# Patient Record
Sex: MALE | Race: WHITE
[De-identification: names, ages, dates, MRNs, and addresses within clinical notes are randomized per-mention and may not be internally consistent; named-entity substitution may affect disease eponyms.]

---

## 2017-04-11 ENCOUNTER — HOSPITAL ENCOUNTER (OUTPATIENT)
Dept: HOSPITAL 47 - RADUSWWP | Age: 52
End: 2017-04-11
Payer: COMMERCIAL

## 2017-04-11 DIAGNOSIS — K76.0: Primary | ICD-10-CM

## 2017-04-11 PROCEDURE — 76705 ECHO EXAM OF ABDOMEN: CPT

## 2017-04-11 NOTE — US
EXAMINATION TYPE: US abdomen limited

 

DATE OF EXAM: 4/11/2017 9:51 AM

 

COMPARISON: NONE

 

CLINICAL HISTORY: R10.11 RUQ Abd Pain, R10.31 RLQ Abd Pain. Umbilical pain that moves to the right si
de x 4 days

 

EXAM MEASUREMENTS:

 

Liver Length:  17.1 cm   

Gallbladder Wall:  0.2 cm   

CBD:  0.5 cm

Right Kidney:  10.1 x 4.3 x 5.4 cm

 

**large body habitus with overlying bowel gas limits exam

 

Pancreas:  unable to fully assess due to bowel gas obscuring

Liver:  intercostal imaging, difficult to penetrate  

Gallbladder:  wnl

**Evidence for sonographic Guzman's sign:  no

CBD:  wnl 

Right Kidney:  wnl 

 

Pancreas is suboptimally evaluated due to patient's large body habitus and overlying bowel gas. Visua
lized liver is heterogeneously hyperechoic in appearance without intrahepatic ductal dilatation. Eval
uation for focal masses is suboptimal due to the heterogeneity. Finding is likely on basis of diffuse
 fatty infiltration. No shadowing mobile gallstones are seen in gallbladder.

 

IMPRESSION: No gallstones or ultrasound evidence for acute cholecystitis. Diffuse fatty infiltration 
of liver is noted.

## 2017-04-11 NOTE — US
EXAMINATION TYPE: US abdomen APPY

 

DATE OF EXAM: 4/11/2017 9:48 AM

 

COMPARISON: NONE

 

CLINICAL HISTORY: R10.11 RUQ Abd Pain, R10.31 RLQ Abd Pain. Umbilical pain that moves to the right si
de x 4 days

 

APPENDIX

AP Diameter (normal < 6mm):  5 mm

     Measured outer wall to outer wall.

 

Is the appendix seen in its entirety from the proximal cecum to distal end:  no 

Is the appendix compressible:  yes 

Does the appendix wall appear hypervascular:  no 

Is an appendicolith present:  no 

Is there inflammatory changes or free fluid present:  no 

 

Tubular shaped structure in right lower quadrant is identified measuring 4 to 5 mm in thickness. It i
s not seen in its entirety. Compression is present. No surrounding vascularity is seen. No well-forme
d fluid collection or abscess is noted.

 

 

 

IMPRESSION:  No convincing ultrasound evidence for acute appendicitis in visualized portion of append
ix. Entire appendix is not successfully visualized however.

## 2017-04-19 ENCOUNTER — HOSPITAL ENCOUNTER (OUTPATIENT)
Dept: HOSPITAL 47 - RADNMMAIN | Age: 52
Discharge: HOME | End: 2017-04-19
Payer: COMMERCIAL

## 2017-04-19 DIAGNOSIS — R10.9: Primary | ICD-10-CM

## 2017-04-19 PROCEDURE — 78226 HEPATOBILIARY SYSTEM IMAGING: CPT

## 2017-04-19 NOTE — NM
EXAMINATION TYPE: NM hepatobiliary w EF

 

DATE OF EXAM: 4/19/2017 11:09 AM

 

COMPARISON: NONE

 

HISTORY: R10.9 abd pain

 

 

TECHNIQUE: After the intravenous administration of 5 mCi Tc 99m Mebrofenin hepatobiliary scintigraphy
 is performed.  1.5 mcg of CCK was also administered. Immediate images post injection.

 

FINDINGS: 

There is satisfactory initial accumulation of tracer by the liver.  The gallbladder is visualized wit
hin 8 minutes.  The small bowel activity is noted within 32 minutes. Gallbladder ejection fraction is
 estimated at 61%. 

 

IMPRESSION: Exam is within normal limits.

## 2020-09-05 ENCOUNTER — HOSPITAL ENCOUNTER (EMERGENCY)
Dept: HOSPITAL 47 - EC | Age: 55
Discharge: HOME | End: 2020-09-05
Payer: COMMERCIAL

## 2020-09-05 VITALS — HEART RATE: 84 BPM | DIASTOLIC BLOOD PRESSURE: 101 MMHG | SYSTOLIC BLOOD PRESSURE: 134 MMHG

## 2020-09-05 VITALS — TEMPERATURE: 98.2 F | RESPIRATION RATE: 18 BRPM

## 2020-09-05 DIAGNOSIS — S61.211A: Primary | ICD-10-CM
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
## 2020-09-05 DIAGNOSIS — Z96.60: ICD-10-CM
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
## 2020-09-05 DIAGNOSIS — Z23: ICD-10-CM
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
## 2020-09-05 PROCEDURE — 90715 TDAP VACCINE 7 YRS/> IM: CPT

## 2020-09-05 PROCEDURE — 12001 RPR S/N/AX/GEN/TRNK 2.5CM/<: CPT

## 2020-09-05 PROCEDURE — 90471 IMMUNIZATION ADMIN: CPT

## 2020-09-05 PROCEDURE — 99283 EMERGENCY DEPT VISIT LOW MDM: CPT

## 2020-09-05 NOTE — ED
General Adult HPI





- General


Source: patient, RN notes reviewed, old records reviewed


Mode of arrival: ambulatory


Limitations: no limitations





<Valentín Bland - Last Filed: 09/06/20 10:19>





<Dorothy Barry - Last Filed: 09/06/20 13:12>





- General


Chief complaint: Wound/Laceration


Stated complaint: IHS-finger lac


Time Seen by Provider: 09/05/20 17:34





- History of Present Illness


Initial comments: 








55-year-old male patient presents to ED for evaluation of laceration.  Patient 

reports she was using a straight razor when he cut the second digit of his left 

hand dorsal aspect proximal to the PIP joint.  Patient reports full active range

of motion sensation is digit.  Denies any other complaints.  Does not know date 

of last tetanus.





Systemic: Pt denies fatigue, fever/chills, rash. Pt denies weakness, night 

sweats, weight loss. 


Neuro: Pt denies headache, visual disturbances, syncope or pre-syncope.


HEENT: Pt denies ocular discharge or irritation, otalgia, rhinorrhea, 

pharyngitis or notable lymphadenopathy. 


Cardiopulmonary: Pt denies chest pain, SOB, heart palpitations, dyspnea on 

exertion.  


Abdominal/GI: Pt denies abdominal pain, n/v/d. 


: Pt denies dysuria, burning w/ urination, frequency/urgency. Denies new onset

urinary or bowel incontinence.  


MSK: Pt denies myalgia, loss of strength or function in extremities. 


Neuro: Pt denies new onset weakness, paresthesias. 








 (Valentín Bland)





- Related Data


                                    Allergies











Allergy/AdvReac Type Severity Reaction Status Date / Time


 


No Known Allergies Allergy   Verified 09/05/20 17:29














Review of Systems


ROS Other: All systems not noted in ROS Statement are negative.





<Valentín Bland - Last Filed: 09/06/20 10:19>


ROS Other: All systems not noted in ROS Statement are negative.





<Dorothy Barry - Last Filed: 09/06/20 13:12>


ROS Statement: 


Those systems with pertinent positive or pertinent negative responses have been 

documented in the HPI.








Past Medical History


Past Medical History: Hypertension


History of Any Multi-Drug Resistant Organisms: None Reported


Past Surgical History: Joint Replacement, Tonsillectomy


Additional Past Surgical History / Comment(s): B knee, R forearm, deviated 

septum surgery


Past Psychological History: No Psychological Hx Reported


Smoking Status: Never smoker


Past Alcohol Use History: Occasional


Past Drug Use History: None Reported





<Valentín Bland - Last Filed: 09/06/20 10:19>





General Exam


Limitations: no limitations





<Valentín Bland - Last Filed: 09/06/20 10:19>





- General Exam Comments


Initial Comments: 








Constitutional: NAD, AOX3, Pt has pleasant affect. 


HEENT: NC/AT, trachea midline, neck supple, no lymphadenopathy. External ears 

appear normal, without discharge. Mucous membranes moist. EOM intact. There is 

no scleral icterus. No pallor noted. 


Cardiopulmonary: RRR, no murmurs, rubs or gallops, no JVD noted. Lungs CTAB in 

anterior and posterior fields. No peripheral edema. 


Abdominal exam: Abdomen soft and non-distended. Abdomen non-tender to palpation 

in all 4 quadrants. Bowel sounds active in LLQ. No hepatosplenomegaly. No 

ecchymosis


Neuro: CN II-XII grossly intact. No nuchal rigidity. 


MSK: 2 cm laceration to second digit left hand dorsal aspect proximal to the PIP

joint.  Irrigated approximately 2 simple interrupted sutures.  Full active range

of motion of digit or vascular intact before and after suture placement.  Full 

active ROM in upper and lower extremities, 5/5 stregnth. 








 (Valentín Bland)





Course





                                   Vital Signs











  09/05/20 09/05/20





  17:29 18:48


 


Temperature 98.2 F 


 


Pulse Rate 94 84


 


Respiratory 18 





Rate  


 


Blood Pressure 149/114 134/101


 


O2 Sat by Pulse 97 96





Oximetry  














Procedures





- Laceration


  ** Laceration #1


Consent Obtained: verbal consent


Indication: laceration


Site: hand (2nd digit L hand )


Size (cm): 2


Description: linear


Anesthetic Used: lidocaine 1%


Anesthesia Technique: local infiltration


Amount (mls): 2


Pre-repair: wound explored, irrigated extensively, deep structures intact


Type of Sutures: nylon


Size of Sutures: 5-0


Number of Sutures: 2


Technique: simple, interrupted


Patient Tolerated Procedure: well, no complications





<Valentín Bland - Last Filed: 09/06/20 10:19>





Medical Decision Making





<Valentín Bland - Last Filed: 09/06/20 10:19>





<Dorothy Barry - Last Filed: 09/06/20 13:12>





- Medical Decision Making








55-year-old male patient presents ED for evaluation of laceration.  Patient 

vital signs stable, afebrile.  Vital exams were laceration which was repaired.  

Patient was discharged return precautions.  Tetanus updated.  Case discussed 

with Dr. Barry. 


 (Valentín Bland)


I was available for consultation in the emergency department.  The history and 

physical exam were done by the midlevel provider. I was consulted for this jessi

ents care. I reviewed the case with the midlevel provider and based on their 

presentation of the patient, I agree with the assessment, medical decision 

making and plan of care as documented. 


Chart was dictated using Dragon dictation software.  Attempts were made to 

correct any dictation errors however some typographical errors may persist. 


Patient was seen during a national state of emergency due to the Covid-19 

pandemic. 


 (Dorothy Barry)





Disposition


Is patient prescribed a controlled substance at d/c from ED?: No





<Valentín Bland - Last Filed: 09/06/20 10:19>





<Dorothy Barry - Last Filed: 09/06/20 13:12>


Clinical Impression: 


 Laceration





Disposition: HOME SELF-CARE


Condition: Stable


Instructions (If sedation given, give patient instructions):  Care For Your 

Stitches (DC), Laceration (DC)


Additional Instructions: 


follow-up with primary care provider in 1-2 days.  Keep area clean and dry and 

covered.  Monitor for infection.  Return to ER with any worsening symptoms.





Please return for suture removal: 





Hand: 7-10 days





Please monitor for signs and symptoms of infection including: redness, warmth, 

drainage, discharge. 





Please return to ED if these signs or symptoms occur, new signs or symptoms 

develop or if condition worsens in anyway. 


Referrals: 


Gurpreet Dickinson DO [Primary Care Provider] - 1-2 days

## 2020-09-21 ENCOUNTER — HOSPITAL ENCOUNTER (INPATIENT)
Dept: HOSPITAL 47 - EC | Age: 55
LOS: 2 days | Discharge: HOME | DRG: 309 | End: 2020-09-23
Attending: INTERNAL MEDICINE | Admitting: INTERNAL MEDICINE
Payer: COMMERCIAL

## 2020-09-21 DIAGNOSIS — I48.0: Primary | ICD-10-CM

## 2020-09-21 DIAGNOSIS — G47.33: ICD-10-CM

## 2020-09-21 DIAGNOSIS — I08.1: ICD-10-CM

## 2020-09-21 DIAGNOSIS — Z96.653: ICD-10-CM

## 2020-09-21 DIAGNOSIS — Z90.89: ICD-10-CM

## 2020-09-21 DIAGNOSIS — N17.9: ICD-10-CM

## 2020-09-21 DIAGNOSIS — E78.5: ICD-10-CM

## 2020-09-21 DIAGNOSIS — K21.9: ICD-10-CM

## 2020-09-21 DIAGNOSIS — Z79.899: ICD-10-CM

## 2020-09-21 DIAGNOSIS — I11.9: ICD-10-CM

## 2020-09-21 DIAGNOSIS — R07.89: ICD-10-CM

## 2020-09-21 DIAGNOSIS — E66.9: ICD-10-CM

## 2020-09-21 LAB
ALBUMIN SERPL-MCNC: 4 G/DL (ref 3.5–5)
ALP SERPL-CCNC: 79 U/L (ref 38–126)
ALT SERPL-CCNC: 54 U/L (ref 4–49)
ANION GAP SERPL CALC-SCNC: 5 MMOL/L
APTT BLD: 23.3 SEC (ref 22–30)
AST SERPL-CCNC: 34 U/L (ref 17–59)
BASOPHILS # BLD AUTO: 0.1 K/UL (ref 0–0.2)
BASOPHILS NFR BLD AUTO: 0 %
BUN SERPL-SCNC: 28 MG/DL (ref 9–20)
CALCIUM SPEC-MCNC: 9.1 MG/DL (ref 8.4–10.2)
CHLORIDE SERPL-SCNC: 108 MMOL/L (ref 98–107)
CO2 SERPL-SCNC: 24 MMOL/L (ref 22–30)
EOSINOPHIL # BLD AUTO: 0.4 K/UL (ref 0–0.7)
EOSINOPHIL NFR BLD AUTO: 3 %
ERYTHROCYTE [DISTWIDTH] IN BLOOD BY AUTOMATED COUNT: 5.42 M/UL (ref 4.3–5.9)
ERYTHROCYTE [DISTWIDTH] IN BLOOD: 13.6 % (ref 11.5–15.5)
GLUCOSE SERPL-MCNC: 77 MG/DL (ref 74–99)
HCT VFR BLD AUTO: 49.5 % (ref 39–53)
HGB BLD-MCNC: 16.5 GM/DL (ref 13–17.5)
INR PPP: 0.9 (ref ?–1.2)
LYMPHOCYTES # SPEC AUTO: 2.3 K/UL (ref 1–4.8)
LYMPHOCYTES NFR SPEC AUTO: 21 %
MAGNESIUM SPEC-SCNC: 2 MG/DL (ref 1.6–2.3)
MCH RBC QN AUTO: 30.4 PG (ref 25–35)
MCHC RBC AUTO-ENTMCNC: 33.3 G/DL (ref 31–37)
MCV RBC AUTO: 91.3 FL (ref 80–100)
MONOCYTES # BLD AUTO: 0.5 K/UL (ref 0–1)
MONOCYTES NFR BLD AUTO: 5 %
NEUTROPHILS # BLD AUTO: 7.7 K/UL (ref 1.3–7.7)
NEUTROPHILS NFR BLD AUTO: 69 %
PLATELET # BLD AUTO: 319 K/UL (ref 150–450)
POTASSIUM SERPL-SCNC: 4.6 MMOL/L (ref 3.5–5.1)
PROT SERPL-MCNC: 7.1 G/DL (ref 6.3–8.2)
PT BLD: 9.7 SEC (ref 9–12)
SODIUM SERPL-SCNC: 137 MMOL/L (ref 137–145)
WBC # BLD AUTO: 11 K/UL (ref 3.8–10.6)

## 2020-09-21 PROCEDURE — 71046 X-RAY EXAM CHEST 2 VIEWS: CPT

## 2020-09-21 PROCEDURE — 83735 ASSAY OF MAGNESIUM: CPT

## 2020-09-21 PROCEDURE — 36415 COLL VENOUS BLD VENIPUNCTURE: CPT

## 2020-09-21 PROCEDURE — 99291 CRITICAL CARE FIRST HOUR: CPT

## 2020-09-21 PROCEDURE — 85025 COMPLETE CBC W/AUTO DIFF WBC: CPT

## 2020-09-21 PROCEDURE — 84484 ASSAY OF TROPONIN QUANT: CPT

## 2020-09-21 PROCEDURE — 96366 THER/PROPH/DIAG IV INF ADDON: CPT

## 2020-09-21 PROCEDURE — 80061 LIPID PANEL: CPT

## 2020-09-21 PROCEDURE — 93306 TTE W/DOPPLER COMPLETE: CPT

## 2020-09-21 PROCEDURE — 80048 BASIC METABOLIC PNL TOTAL CA: CPT

## 2020-09-21 PROCEDURE — 85610 PROTHROMBIN TIME: CPT

## 2020-09-21 PROCEDURE — 80053 COMPREHEN METABOLIC PANEL: CPT

## 2020-09-21 PROCEDURE — 96376 TX/PRO/DX INJ SAME DRUG ADON: CPT

## 2020-09-21 PROCEDURE — 85730 THROMBOPLASTIN TIME PARTIAL: CPT

## 2020-09-21 PROCEDURE — 96365 THER/PROPH/DIAG IV INF INIT: CPT

## 2020-09-21 PROCEDURE — 93005 ELECTROCARDIOGRAM TRACING: CPT

## 2020-09-21 RX ADMIN — PANTOPRAZOLE SODIUM SCH MG: 40 TABLET, DELAYED RELEASE ORAL at 23:13

## 2020-09-21 RX ADMIN — CEPHALEXIN SCH MG: 500 CAPSULE ORAL at 23:13

## 2020-09-21 NOTE — ED
Chest Pain HPI





<Juventino Vázquez - Last Filed: 09/21/20 17:48>





- General


Source: patient


Mode of arrival: ambulatory


Limitations: no limitations





<Vera Montano - Last Filed: 09/21/20 18:16>





- General


Chief Complaint: Chest Pain


Stated Complaint: esophagus problems/pressure


Time Seen by Provider: 09/21/20 16:44





- History of Present Illness


Initial Comments: 





Patient is a 55-year-old male, history hypertension, presenting to the emergency

Department with complaints of chest tightness that has been intermittent over 

the past few days.  He is also complaining about intermittent dizziness and 

shortness of breath when he feels like his heart rate increases.  Patient states

he does not have a history of A. fib but feels like he has been going in and out

of it for years now.  Patient states he was mildly evaluated for it a few years 

ago but he was never in A. fib.  He is currently not on blood thinners.  Patient

states what made him come in to the ER today was he again had an episode of 

chest tightness and palpitations as well as nausea that started yesterday and 

continued into today.  Patient states she attempted to go to work today but felt

really dizzy and so he decided to come in to the ER.  He describes his tightness

at the middle of his chest.  He denies any recent fever, chills, cough.  He does

not feel like he short of breath at this time.  He denies any blurry vision, 

abdominal pain, nausea or vomiting.  He has been taking his hypertensive 

medication as prescribed.  He states he occasionally takes a baby aspirin and he

also takes Adipex for years now.  He has no further complaints at this time.  

Upon arrival to the ER, his vital signs are stable, pulse is 75.  However upon 

recheck during exam, Gurpreet is 160. (Vera Montano)





- Related Data


                                    Allergies











Allergy/AdvReac Type Severity Reaction Status Date / Time


 


No Known Allergies Allergy   Verified 09/05/20 17:29














Review of Systems


ROS Other: All systems not noted in ROS Statement are negative.





<GurpreetJuventino - Last Filed: 09/21/20 17:48>


ROS Other: All systems not noted in ROS Statement are negative.





<Vera Montano - Last Filed: 09/21/20 18:16>


ROS Statement: 


Those systems with pertinent positive or pertinent negative responses have been 

documented in the HPI.








EKG Findings





- EKG Comments:


EKG Findings:: EKG shows atrial fibrillation with RVR, no signs of acute 

ischemia.  Ventricular rate is 160, QRS duration 90, .





<Vera Montano - Last Filed: 09/21/20 18:16>





Past Medical History


Past Medical History: GERD/Reflux, Hypertension


History of Any Multi-Drug Resistant Organisms: None Reported


Past Surgical History: Joint Replacement, Orthopedic Surgery, Tonsillectomy


Additional Past Surgical History / Comment(s): B knee, R forearm, deviated 

septum surgery


Past Psychological History: No Psychological Hx Reported


Smoking Status: Never smoker


Past Alcohol Use History: Occasional


Past Drug Use History: None Reported





<Vera Montano - Last Filed: 09/21/20 18:16>





General Exam


Limitations: no limitations





<Vera Montano - Last Filed: 09/21/20 18:16>





- General Exam Comments


Initial Comments: 





GENERAL: 


Patient is well-developed and well-nourished.  Patient is mildly diaphoretic, in

no acute distress.





HEAD: 


Atraumatic, normocephalic.





EYES:


Pupils equal round and reactive to light, extraocular movements intact, sclera 

anicteric, conjunctiva are normal.  Eyelids were unremarkable.





ENT: 


TMs normal, nares patent, oropharynx clear without exudates.  Moist mucous 

membranes.





NECK: 


Normal range of motion, supple without lymphadenopathy or JVD.





LUNGS:


Unlabored respirations.  Breath sounds clear to auscultation bilaterally and 

equal.  No wheezes rales or rhonchi.





HEART:


Tachycardic, irregular rate and rhythm without murmurs, rubs or gallops.





ABDOMEN: 


Soft, nontender, normoactive bowel sounds.  No guarding, no rebound.  No masses 

appreciated.





: Deferred 





MUSCULOSKELETAL: 


Normal extremities with adequate strength and normal range of motion, no pitting

or edema.  No clubbing or cyanosis.





NEUROLOGICAL: 


Patient is alert and oriented x 3.  Motor and sensory are also intact.  Cranial 

nerves II through XII grossly intact.  Symmetrical smile.  Normal speech, normal

gait.   





PSYCH:


Normal mood, normal affect.





SKIN:


 Warm, Dry, normal turgor, no rashes or lesions noted. (Vera Montano)





Course





<Juventino Vázquez - Last Filed: 09/21/20 17:48>


                                   Vital Signs











  09/21/20 09/21/20 09/21/20





  16:19 17:31 17:52


 


Temperature 98.3 F  


 


Pulse Rate 75 122 H 128 H


 


Respiratory 18 18 18





Rate   


 


Blood Pressure 116/81 109/75 91/58


 


O2 Sat by Pulse 97 95 96





Oximetry   














- Reevaluation(s)


Reevaluation #1: 





09/21/20 17:48


PA supervision: I proceeded face-to-face evaluation the patient.  He states he 

started developing palpitations exertional dyspnea and some midsternal chest 

pain yesterday morning.  It persisted throughout today.  He was found upon 

arrival to have A. fib with RVR.  I did examine patient did demonstrate an 

irregularly irregular heartbeat with tachycardia and lungs were clear no overt 

peripheral edema.  I did discuss case with Dr. Pierce patient will be admitted 

with cardiology consultation.  Patient will be anticoagulated and placed on a 

Cardizem drip. (Juventino Vázquez)





Chest Pain MDM





<Vera Montano - Last Filed: 09/21/20 18:16>





- [facility]





Patient is a 55-year-old male here for chest pain, intermittent dizziness and 

nausea since yesterday.  Initial EKG showed A. fib with RVR, heart rate from 160

to 180s.  Patient is slightly diaphoretic.  He denies history of A. fib, he has 

not on blood thinners.  Rest of his vitals are normal.  Labs show slight 

leukocytosis, most likely reactive, rest of labs are within normal limits.  

Troponin is normal.  Chest x-ray shows no acute process.  Patient was started on

a Cardizem drip, low dose heparin, patient will be admitted with cardiac 

consult.  Patient was accepted by Dr. Pierce.  Case discussed with Dr. Vázquez.  

(Vera Montano)





Critical Care Time


Critical Care Time: Yes


Total Critical Care Time: 35 (New-onset A. fib with RVR, heart rate from 160s to

180s, started on Cardizem drip, chest pain, dizziness.)





<Vera Montano - Last Filed: 09/21/20 18:16>





Disposition





<Juventino Vázquez - Last Filed: 09/21/20 17:48>


Decision Date: 09/21/20


Decision Time: 17:36





<Vera Montano - Last Filed: 09/21/20 18:16>


Clinical Impression: 


 Chest pain, New onset a-fib, Atrial fibrillation with rapid ventricular 

response





Disposition: ADMITTED AS IP TO THIS HOSP


Condition: Stable


Referrals: 


Gurpreet Dikcinson DO [Primary Care Provider] - 1-2 days

## 2020-09-21 NOTE — XR
EXAMINATION TYPE: XR chest 2V

 

DATE OF EXAM: 9/21/2020

 

COMPARISON: 12/3/2013

 

HISTORY: Chest pain

 

TECHNIQUE:

 

FINDINGS: There is minimal linear density left lung base. There is no definite pleural effusion. Ther
e are no hilar masses. Heart size is normal. Bony thorax is intact. There are chest leads.

 

IMPRESSION: Mild pleural reaction left lung base unchanged. Normal heart.

## 2020-09-21 NOTE — P.HPIM
History of Present Illness


H&P Date: 09/21/20


Chief Complaint: A. fib with RVR, chest pain and angina, esophageal pressure and

pain.





55-year-old mildly overweight male one of Dr. Dickinson patient with past 

medical history of hypertension, hyperlipidemia, obstructive sleep apnea does 

not use his CPAP, mild obesity, hyperglycemia who has been complaining for 

arrhythmia on and off for a few years in the last 2 weeks become much worse 

according to him yesterday patient developed to have quieted arrhythmia with 

sweating and felt quite bit hydrated despite drinking enough water symptom did 

not improve with refused to come to demurs department at the time ended up 

resting in the symptom this appeared.  Patient felt better this morning as is 

getting ready to go to work developed to have midsternal pain and discomfort 

radiating toward the jaw area on the left upper side of his chest wall his 

symptoms become much worse associated with palpitation and lightheadedness 

nausea and cold sweat.  Patient ended up coming to the emergency department at 

[facility] with the Na problem was seen and evaluated CK with 

troponin came back negative patient found to be in A. fib with RVR pulse rate 

running over 160 beats per minutes.  Patient was started on Cardizem drip along 

with heparin drip will be admitted to the hospital his CK with troponin 2 was 

negative so far patient be seeing cardiology and continue aggressive medical 

management with his presentation specially feeling presyncope when he had a 

chest pain patient might need to go for heart cath.





Review of Systems





CONSTITUTIONAL: Well-developed no acute respiratory distress.


EYES: No icterus sclerae, no conjunctivitis.


EARS, NOSE, MOUTH, THROAT, and FACE: No sore throat, lymphadenopathy, carotid 

bruits or deformity.


RESPIRATORY: Positive chest pain shortness of breath positive tightness and 

palpitation.


CARDIOVASCULAR: Positive chest pain with angina with significant palpitation and

A. fib would lightheadedness.


GASTROINTESTINAL: No Abd pain, Nausea or vomiting, no Diarrhea or constipation, 

No GI Bleed, no distention or masses.


GENITOURINARY: Negative for Hematuria or UTI, no kidney stones.


INTEGUMENT/BREAST: Negative for any muscular injury with mild osteoarthritis..


HEMATOLOGIC/LYMPHATIC: Negative for bleed or purpura.


MUSCULOSKELTAL: Negative for Myalgia or arthralgia.


NEURLOGICAL: No LOC, Sz or syncope, blurred vision dizziness or abnormality..


BEHAVIORAL/PSYCH: Negative.


ENDOCRINE: Negative.





Past Medical History


Past Medical History: GERD/Reflux, Hypertension


History of Any Multi-Drug Resistant Organisms: None Reported


Past Surgical History: Joint Replacement, Orthopedic Surgery, Tonsillectomy


Additional Past Surgical History / Comment(s): B knee, R forearm, deviated 

septum surgery


Past Psychological History: No Psychological Hx Reported


Smoking Status: Never smoker


Past Alcohol Use History: Occasional


Past Drug Use History: None Reported





Medications and Allergies


                                Home Medications











 Medication  Instructions  Recorded  Confirmed  Type


 


Cephalexin [Keflex] 500 mg PO QID 09/21/20 09/21/20 History


 


Ibuprofen 800 mg PO Q8H PRN 09/21/20 09/21/20 History


 


Lansoprazole 30 mg PO BID 09/21/20 09/21/20 History


 


Losartan Potassium [Cozaar] 100 mg PO DAILY 09/21/20 09/21/20 History


 


Phentermine HCl 37.5 mg PO AC-BRKFST 09/21/20 09/21/20 History


 


Sildenafil Citrate 100 mg PO DAILY PRN 09/21/20 09/21/20 History


 


traMADol HCl [Ultram] 50 mg PO TID PRN 09/21/20 09/21/20 History








                                    Allergies











Allergy/AdvReac Type Severity Reaction Status Date / Time


 


No Known Allergies Allergy   Verified 09/21/20 18:36














Physical Exam


Vitals: 


                                   Vital Signs











  Temp Pulse Resp BP Pulse Ox


 


 09/21/20 18:10   115 H  16  120/91 


 


 09/21/20 17:52   128 H  18  91/58  96


 


 09/21/20 17:31   122 H  18  109/75  95


 


 09/21/20 16:19  98.3 F  75  18  116/81  97








                                Intake and Output











 09/21/20 09/21/20 09/21/20





 06:59 14:59 22:59


 


Other:   


 


  Weight   143.789 kg








General Appearance: Alert, cooperative, no distress, appears stated age.  

Morbidly obese.


Neck HEENT: Supple, no lymphadenopathy, no thyroid enlargement, no carotid 

bruits.


Lungs: Clear to auscultation without crackles or wheezes no rhonchi, no 

deformity.


Chest Wall: Decrease expansion with deep inspiration no tenderness and no 

deformity was found on exam, no costochondral pain or discomfort.


Heart: Irregular rate and rhythm, S1, S2 positive sleep systolic murmur.


Back: Symmetric, no curvature, ROM normal, no CVA tenderness.


Abdomen: Soft, non-tender, bowel sounds active all four quadrants, no masses, no

organomegaly.


Extremities: Extremities normal, atraumatic, no cyanosis or edema.


Pulses: 2+ and symmetric.


Skin: Skin color, texture, tugor normal, no rashes or lesions.


Neurologic: Alert oriented x3 cranial nerves II through XII intact, no motor 

deficit, no abnormal balance or gait.





Results


CBC & Chem 7: 


                                 09/21/20 17:01 09/21/20 17:01


Labs: 


                  Abnormal Lab Results - Last 24 Hours (Table)











  09/21/20 09/21/20 Range/Units





  17:01 17:01 


 


WBC  11.0 H   (3.8-10.6)  k/uL


 


Chloride   108 H  ()  mmol/L


 


BUN   28 H  (9-20)  mg/dL


 


Creatinine   1.37 H  (0.66-1.25)  mg/dL


 


ALT   54 H  (4-49)  U/L














Thrombosis Risk Factor Assmnt





- DVT/VTE Prophylaxis


DVT/VTE Prophylaxis: Pharmacologic Prophylaxis ordered, Mechanical Prophylaxis 

ordered





Assessment and Plan


Assessment: 





1 A. fib with RVR: Patient was started Cardizem drip and heparin drip consult 

cardiology and echocardiogram will be done continue current management and 

switch to beta blocker eventually.





2 atypical angina with worsening A. fib patient could have been having blockage 

and acute coronary syndrome causing the A. fib to appear patient will need to be

studied by a running possibly stress test or if need heart cath.





3 obstructive sleep apnea: Patient is not using CPAP at this point sleep hygiene

and encourage patient to use CPAP O2 be retrained.





4 hyperlipidemia: Remain on statin regularly watch for any abnormal liver 

function test.





5 acute kidney injury: Continue to hydrate patient repeat BUN/creatinine.





6 CODE STATUS: Full code.





Admit patient to inpatient status for more than 2 night stay.

## 2020-09-22 LAB
BASOPHILS # BLD AUTO: 0 K/UL (ref 0–0.2)
BASOPHILS NFR BLD AUTO: 0 %
CHOLEST SERPL-MCNC: 166 MG/DL (ref ?–200)
EOSINOPHIL # BLD AUTO: 0.2 K/UL (ref 0–0.7)
EOSINOPHIL NFR BLD AUTO: 2 %
ERYTHROCYTE [DISTWIDTH] IN BLOOD BY AUTOMATED COUNT: 5.04 M/UL (ref 4.3–5.9)
ERYTHROCYTE [DISTWIDTH] IN BLOOD: 13.7 % (ref 11.5–15.5)
HCT VFR BLD AUTO: 46.3 % (ref 39–53)
HDLC SERPL-MCNC: 42 MG/DL (ref 40–60)
HGB BLD-MCNC: 15.1 GM/DL (ref 13–17.5)
LDLC SERPL CALC-MCNC: 110 MG/DL (ref 0–99)
LYMPHOCYTES # SPEC AUTO: 1.8 K/UL (ref 1–4.8)
LYMPHOCYTES NFR SPEC AUTO: 22 %
MCH RBC QN AUTO: 30.1 PG (ref 25–35)
MCHC RBC AUTO-ENTMCNC: 32.7 G/DL (ref 31–37)
MCV RBC AUTO: 91.9 FL (ref 80–100)
MONOCYTES # BLD AUTO: 0.3 K/UL (ref 0–1)
MONOCYTES NFR BLD AUTO: 4 %
NEUTROPHILS # BLD AUTO: 5.7 K/UL (ref 1.3–7.7)
NEUTROPHILS NFR BLD AUTO: 70 %
PLATELET # BLD AUTO: 255 K/UL (ref 150–450)
TRIGL SERPL-MCNC: 72 MG/DL (ref ?–150)
WBC # BLD AUTO: 8.1 K/UL (ref 3.8–10.6)

## 2020-09-22 RX ADMIN — FAMOTIDINE SCH MG: 20 TABLET, FILM COATED ORAL at 10:45

## 2020-09-22 RX ADMIN — APIXABAN SCH MG: 5 TABLET, FILM COATED ORAL at 21:11

## 2020-09-22 RX ADMIN — CEPHALEXIN SCH MG: 500 CAPSULE ORAL at 17:59

## 2020-09-22 RX ADMIN — PANTOPRAZOLE SODIUM SCH MG: 40 TABLET, DELAYED RELEASE ORAL at 10:45

## 2020-09-22 RX ADMIN — APIXABAN SCH MG: 5 TABLET, FILM COATED ORAL at 13:05

## 2020-09-22 RX ADMIN — CEPHALEXIN SCH MG: 500 CAPSULE ORAL at 10:45

## 2020-09-22 RX ADMIN — PANTOPRAZOLE SODIUM SCH: 40 TABLET, DELAYED RELEASE ORAL at 21:12

## 2020-09-22 RX ADMIN — CEPHALEXIN SCH: 500 CAPSULE ORAL at 12:30

## 2020-09-22 RX ADMIN — DILTIAZEM HYDROCHLORIDE SCH MLS/HR: 5 INJECTION INTRAVENOUS at 10:46

## 2020-09-22 RX ADMIN — CEPHALEXIN SCH MG: 500 CAPSULE ORAL at 21:11

## 2020-09-22 RX ADMIN — LOSARTAN POTASSIUM SCH MG: 50 TABLET, FILM COATED ORAL at 10:46

## 2020-09-22 NOTE — P.PN
Subjective


Progress Note Date: 09/22/20





HISTORY OF PRESENT ILLNESS


55-year-old mildly overweight male one of Dr. Dickinson patient with past 

medical history of hypertension, hyperlipidemia, obstructive sleep apnea does n

ot use his CPAP, mild obesity, hyperglycemia who has been complaining for 

arrhythmia on and off for a few years in the last 2 weeks become much worse 

according to him yesterday patient developed to have quieted arrhythmia with 

sweating and felt quite bit hydrated despite drinking enough water symptom did 

not improve with refused to come to demurs department at the time ended up 

resting in the symptom this appeared.  Patient felt better this morning as is 

getting ready to go to work developed to have midsternal pain and discomfort 

radiating toward the jaw area on the left upper side of his chest wall his 

symptoms become much worse associated with palpitation and lightheadedness 

nausea and cold sweat.  Patient ended up coming to the emergency department at 

[facility] with the Na problem was seen and evaluated CK with 

troponin came back negative patient found to be in A. fib with RVR pulse rate 

running over 160 beats per minutes.  Patient was started on Cardizem drip along 

with heparin drip will be admitted to the hospital his CK with troponin 2 was 

negative so far patient be seeing cardiology and continue aggressive medical 

management with his presentation specially feeling presyncope when he had a 

chest pain patient might need to go for heart cath.





9/22: Patient denies any chest pain, shortness of breath, palpitations.  He is 

still on Cardizem drip and heparin drip. Repeat CBC is unremarkable.  

Triglycerides 72, cholesterol 166, , HDL 42.  Troponins have been 

negative on 3 draws.  Repeat chemistry ordered for tomorrow.  Patient has been 

seen by cardiology with plan to continue Cardizem drip and stop heparin drip and

start the patient on oral anticoagulation and metoprolol. We have ordered 

echocardiogram.





REVIEW OF SYSTEMS


CONSTITUTIONAL: Well-developed no acute respiratory distress.


EYES: No icterus sclerae, no conjunctivitis.


EARS, NOSE, MOUTH, THROAT, and FACE: No sore throat, lymphadenopathy, carotid 

bruits or deformity.


RESPIRATORY: denies chest pain denies shortness of breath denies palpitation.


CARDIOVASCULAR: Positive chest pain with angina with significant palpitation and

A. fib would lightheadedness.


GASTROINTESTINAL: No Abd pain, Nausea or vomiting, no Diarrhea or constipation, 

No GI Bleed, no distention or masses.


GENITOURINARY: Negative for Hematuria or UTI, no kidney stones.


INTEGUMENT/BREAST: Negative for any muscular injury with mild osteoarthritis..


HEMATOLOGIC/LYMPHATIC: Negative for bleed or purpura.


MUSCULOSKELTAL: Negative for Myalgia or arthralgia.


NEURLOGICAL: No LOC, Sz or syncope, blurred vision dizziness or abnormality..


BEHAVIORAL/PSYCH: Negative.


ENDOCRINE: Negative.





PHYSICAL EXAMINATION


Gen: This is a morbidly obese 55-year-old  male.  He is resting in bed 

and appears to be comfortable.


HEENT: Head is atraumatic, normocephalic. Pupils equal, round. Sclerae is 

anicteric. 


NECK: Supple. No JVD. No lymphadenopathy. No thyromegaly. 


LUNGS: Clear to auscultation. No wheezes or rhonchi.  No intercostal 

retractions.


HEART: Regular rate and rhythm. systolic murmur. 


ABDOMEN: Soft. Bowel sounds are present. No masses.  No tenderness.


EXTREMITIES: No pedal edema.  No calf tenderness. dorsalis pedis +2 bilaterally.


NEUROLOGICAL: Patient is awake, alert and oriented x3. Cranial nerves 2 through 

12 are grossly intact. 





ASSESSMENT AND PLAN


1. Atrial fibrillation with RVR, paroxysmal atrial fibrillation.  Patient is cur

rently on heparin drip and Cardizem drip with plan to start Eliquis and oral 

metoprolol.  Cardiology consult appreciated. Echocardiogram ordered.





2. Chest pain, acute coronary syndrome ruled out.





3. Obstructive sleep apnea.  Patient is not currently using his CPAP.





4. Hyperlipidemia. 





5. Acute kidney injury. Repeat BMP in the morning.





6. GERD. Continue protonix.








Discharge plan: home.





Impression and plan of care have been directed as dictated by the signing 

physician.  Evangelina Temple nurse practitioner acting as scribe for signing 

physician.





Objective





- Vital Signs


Vital signs: 


                                   Vital Signs











Temp  97.2 F L  09/21/20 20:10


 


Pulse  133 H  09/22/20 03:55


 


Resp  19   09/22/20 03:55


 


BP  130/67   09/22/20 03:55


 


Pulse Ox  95   09/22/20 03:55








                                 Intake & Output











 09/21/20 09/22/20 09/22/20





 18:59 06:59 18:59


 


Intake Total  93.911 


 


Balance  93.911 


 


Weight 143.789 kg 144.3 kg 


 


Intake:   


 


  IV  10 


 


    Invasive Line 2  10 


 


  Intake, IV Titration  83.911 





  Amount   


 


    Diltiazem 125 mg In  29.25 





    Sodium Chloride 0.9% 100   





    ml @ 6 MG/HR 6 mls/hr IV   





    .[id number] HUGO Rx#:   





    [id number]   


 


    Heparin Sod,Pork in 0.45%  54.661 





    NaCl 25,000 unit In 0.45   





    % NaCl 1 250ml.bag @ 6.   





    954 UNITS/KG/HR 9.999 mls   





    /hr IV .Q24H HUGO Rx#:   





    [id number]   


 


Other:   


 


  Voiding Method  Toilet 


 


  # Voids  2 














- Labs


CBC & Chem 7: 


                                 09/22/20 07:57





                                 09/21/20 17:01


Labs: 


                  Abnormal Lab Results - Last 24 Hours (Table)











  09/21/20 09/21/20 09/22/20 Range/Units





  17:01 17:01 07:57 


 


WBC  11.0 H    (3.8-10.6)  k/uL


 


APTT     (22.0-30.0)  sec


 


Chloride   108 H   ()  mmol/L


 


BUN   28 H   (9-20)  mg/dL


 


Creatinine   1.37 H   (0.66-1.25)  mg/dL


 


ALT   54 H   (4-49)  U/L


 


LDL Cholesterol, Calc    110 H  (0-99)  mg/dL














  09/22/20 Range/Units





  07:57 


 


WBC   (3.8-10.6)  k/uL


 


APTT  41.3 H  (22.0-30.0)  sec


 


Chloride   ()  mmol/L


 


BUN   (9-20)  mg/dL


 


Creatinine   (0.66-1.25)  mg/dL


 


ALT   (4-49)  U/L


 


LDL Cholesterol, Calc   (0-99)  mg/dL

## 2020-09-22 NOTE — P.CRDCN
History of Present Illness


Consult date: 20


Chief complaint: Chest pain


History of present illness: 





This is a pleasant 55-year-old gentleman with a past medical history significant

for obesity, obstructive sleep apnea, hypertension, and history of "racing 

heart" presented to the emergency department complaining of chest discomfort and

also symptoms of heart racing and fluttering.  For the last few days he has been

experiencing symptoms of chest discomfort.  He described the discomfort as 

tightness in the mid of the chest without any radiations to the arms or neck or 

shoulders and without any associated symptoms of sweating or dizziness or 

lightheadedness or syncope.  Yesterday he did develop symptoms of heart racing 

and fluttering and that was associated with some dizziness and lightheadedness 

and because of that he decided to come to the emergency department because the 

symptoms did not go away.  In the emergency department he was found to be in 

atrial fibrillation with RVR.  No documented history in the electronic medical 

records indicate that he did have any history of atrial fibrillation with RVR 

but he was told in the past that he does have "racing heart".  The patient does 

have obstructive sleep apnea but for the last one year he has not been using the

CPAP machine because he stated that it was not working.  Beside that he does 

have hypertension and he is on losartan or that as an outpatient.  He is not on 

any AV paola blocker agents as an outpatient nor any oral anticoagulation.  

Currently he is on Cardizem IV as well as heparin IV.  An echocardiogram was 

ordered and is in process to be done.  The troponin came in to be within normal 

limits and he was ruled out for acute coronary event.  The chest x-ray did not 

show any acute abnormalities.  The rest of blood work came in to be unremarkable





Past Medical History


Past Medical History: GERD/Reflux, Hypertension, Sleep Apnea/CPAP/BIPAP


Additional Past Medical History / Comment(s): pt states he had a blood infection

where he  3x, pt states back in early .


History of Any Multi-Drug Resistant Organisms: None Reported


Past Surgical History: Joint Replacement, Orthopedic Surgery, Tonsillectomy


Additional Past Surgical History / Comment(s): B knee, R forearm, deviated 

septum surgery


Past Anesthesia/Blood Transfusion Reactions: No Reported Reaction


Past Psychological History: No Psychological Hx Reported


Smoking Status: Never smoker


Past Alcohol Use History: Occasional


Past Drug Use History: None Reported





Medications and Allergies


                                Home Medications











 Medication  Instructions  Recorded  Confirmed  Type


 


Cephalexin [Keflex] 500 mg PO QID 20 History


 


Ibuprofen 800 mg PO Q8H PRN 20 History


 


Lansoprazole 30 mg PO BID 20 History


 


Losartan Potassium [Cozaar] 100 mg PO DAILY 20 History


 


Phentermine HCl 37.5 mg PO AC-BRKFST 20 History


 


Sildenafil Citrate 100 mg PO DAILY PRN 20 History


 


traMADol HCl [Ultram] 50 mg PO TID PRN 20 History








                                    Allergies











Allergy/AdvReac Type Severity Reaction Status Date / Time


 


No Known Allergies Allergy   Verified 20 18:36














Physical Exam


Vitals: 


                                   Vital Signs











  Temp Pulse Pulse Resp BP BP Pulse Ox


 


 20 03:55    133 H  19   130/67  95


 


 20 00:00    117 H  19   130/84  97


 


 20 20:10  97.2 F L   125 H  18   132/85  97


 


 20 19:09   129 H   18  101/63   96


 


 20 18:10   115 H   16  120/91  


 


 20 17:52   128 H   18  91/58   96


 


 20 17:31   122 H   18  109/75   95


 


 20 16:19  98.3 F  75   18  116/81   97








                                Intake and Output











 20





 22:59 06:59 14:59


 


Intake Total  93.911 


 


Balance  93.911 


 


Intake:   


 


  IV  10 


 


    Invasive Line 2  10 


 


  Intake, IV Titration  83.911 





  Amount   


 


    Diltiazem 125 mg In  29.25 





    Sodium Chloride 0.9% 100   





    ml @ 6 MG/HR 6 mls/hr IV   





    .[id number] HUGO Rx#:   





    [id number]   


 


    Heparin Sod,Pork in 0.45%  54.661 





    NaCl 25,000 unit In 0.45   





    % NaCl 1 250ml.bag @ 6.   





    954 UNITS/KG/HR 9.999 mls   





    /hr IV .Q24H HUGO Rx#:   





    [id number]   


 


Other:   


 


  Voiding Method Toilet Toilet 


 


  # Voids  2 


 


  Weight 143.789 kg 144.3 kg 














- Constitutional


General appearance: no acute distress





- Respiratory


Respiratory: bilateral: CTA





- Cardiovascular


Rhythm: regular


Heart sounds: normal: S1, S2





Results





                                 20 07:57





                                 20 17:01


                                 Cardiac Enzymes











  20 Range/Units





  17:01 17:01 20:19 


 


AST  34    (17-59)  U/L


 


Troponin I   <0.012  <0.012  (0.000-0.034)  ng/mL














  20 Range/Units





  23:25 


 


AST   (17-59)  U/L


 


Troponin I  <0.012  (0.000-0.034)  ng/mL








                                   Coagulation











  20 Range/Units





  17:01 23:25 07:57 


 


PT  9.7    (9.0-12.0)  sec


 


APTT  23.3  26.6  41.3 H  (22.0-30.0)  sec








                                     Lipids











  20 Range/Units





  07:57 


 


Triglycerides  72  (<150)  mg/dL


 


Cholesterol  166  (<200)  mg/dL


 


HDL Cholesterol  42  (40-60)  mg/dL








                                       CBC











  20 Range/Units





  17:01 07:57 


 


WBC  11.0 H  8.1  (3.8-10.6)  k/uL


 


RBC  5.42  5.04  (4.30-5.90)  m/uL


 


Hgb  16.5  15.1  (13.0-17.5)  gm/dL


 


Hct  49.5  46.3  (39.0-53.0)  %


 


Plt Count  319  255  (150-450)  k/uL








                          Comprehensive Metabolic Panel











  20 Range/Units





  17:01 


 


Sodium  137  (137-145)  mmol/L


 


Potassium  4.6  (3.5-5.1)  mmol/L


 


Chloride  108 H  ()  mmol/L


 


Carbon Dioxide  24  (22-30)  mmol/L


 


BUN  28 H  (9-20)  mg/dL


 


Creatinine  1.37 H  (0.66-1.25)  mg/dL


 


Glucose  77  (74-99)  mg/dL


 


Calcium  9.1  (8.4-10.2)  mg/dL


 


AST  34  (17-59)  U/L


 


ALT  54 H  (4-49)  U/L


 


Alkaline Phosphatase  79  ()  U/L


 


Total Protein  7.1  (6.3-8.2)  g/dL


 


Albumin  4.0  (3.5-5.0)  g/dL








                               Current Medications











Generic Name Dose Route Start Last Admin





  Trade Name Freq  PRN Reason Stop Dose Admin


 


Cephalexin  500 mg  20 22:00  20 23:13





  Cephalexin 500 Mg Cap  PO   500 mg





  QID HUGO   Administration


 


Famotidine  20 mg  20 09:00 





  Famotidine 20 Mg Tab  PO  





  DAILY Pending sale to [facility]  


 


Heparin Sodium (Porcine)  0 unit  20 17:34  20 00:41





  Heparin Sodium,Porcine 5,000 Unit/Ml 1 Ml Vial  IV   4,000 unit





  PER PROTOCOL PRN   Administration





  Low PTT  





  Protocol  


 


Diltiazem HCl 125 mg/ Sodium  125 mls @ 6 mls/hr  20 17:00  20 23:16





  Chloride  IV   6 mg/hr





  .[id number] HUGO   6 mls/hr





    Infusion





  6 MG/HR  


 


Heparin Sodium/Sodium Chloride  250 mls @ 9.999 mls/hr  20 17:45  20

 00:32





  25,000 unit/ Sodium Chloride  IV   9.954 units/kg/hr





  .Q24H HUGO   14.313 mls/hr





    Titration





  Protocol  





  6.954 UNITS/KG/HR  


 


Losartan Potassium  100 mg  20 09:00 





  Losartan 50 Mg Tab  PO  





  DAILY Pending sale to [facility]  


 


Nitroglycerin  0.4 mg  20 17:31 





  Nitroglycerin Sl Tabs 0.4 Mg Tab  SUBLINGUAL  





  Q5M PRN  





  Chest Pain  


 


Pantoprazole Sodium  40 mg  20 21:00  20 23:13





  Pantoprazole 40 Mg Tablet  PO   40 mg





  BID Pending sale to [facility]   Administration


 


Tramadol HCl  50 mg  20 19:08 





  Tramadol 50 Mg Tab  PO  





  TID PRN  





  Pain  








                                Intake and Output











 20





 22:59 06:59 14:59


 


Intake Total  93.911 


 


Balance  93.911 


 


Intake:   


 


  IV  10 


 


    Invasive Line 2  10 


 


  Intake, IV Titration  83.911 





  Amount   


 


    Diltiazem 125 mg In  29.25 





    Sodium Chloride 0.9% 100   





    ml @ 6 MG/HR 6 mls/hr IV   





    .[id number] Pending sale to [facility] Rx#:   





    [id number]   


 


    Heparin Sod,Pork in 0.45%  54.661 





    NaCl 25,000 unit In 0.45   





    % NaCl 1 250ml.bag @ 6.   





    954 UNITS/KG/HR 9.999 mls   





    /hr IV .Q24H Pending sale to [facility] Rx#:   





    [id number]   


 


Other:   


 


  Voiding Method Toilet Toilet 


 


  # Voids  2 


 


  Weight 143.789 kg 144.3 kg 








                                        





                                 20 07:57 





                                 20 17:01 











Assessment and Plan


Assessment: 





Assessment


#1 atrial fibrillation with RVR


#2 atypical chest discomfort


#3 obesity


#4 obstructive sleep apnea





Plan


#1 acute coronary event was ruled out


#2 rule out severe underlying coronary artery disease


#3 continue Cardizem as well as heparin


#4 start the patient on oral anticoagulation down the line


#5 start the patient on metoprolol


#6 stop aspirin


#7 obtain a stress test once the patient converted to normal sinus mechanism


#8 follow-up on the echocardiogram

## 2020-09-22 NOTE — ECHOF
Referral Reason:LVF



MEASUREMENTS

--------

HEIGHT: 180.3 cm

WEIGHT: 144.2 kg

BP: 130/67

IVSd:   1.8 cm     (0.6 - 1.1)

LVIDd:   4.1 cm     (3.9 - 5.3)

LVPWd:   1.6 cm     (0.6 - 1.1)

IVSs:   2.2 cm

LVIDs:   2.5 cm

LVPWs:   2.2 cm

LA Diam:   3.5 cm     (2.7 - 3.8)

RVIDd:   2.8 cm     (< 3.3)

LAESV Index (A-L):   24.43 ml/m

Ao Diam:   3.6 cm     (2.0 - 3.7)

AV Cusp:   2.5 cm     (1.5 - 2.6)

EPSS:   0.4 cm

RAP:   5.00 mmHg

RVSP:   27.60 mmHg

MV EF SLOPE:   56.20 mm/s     (70 - 150)

MV EXCURSION:   17.70 mm     (> 18.000)







FINDINGS

--------

Atrial fibrillation.

This was a technically difficult study with suboptimal apical views.

The left ventricular size is normal.   There is severe concentric left ventricular hypertrophy.   Ove
rall left ventricular systolic function is normal with, an EF between 65 - 70 %.

The right ventricle is normal in size.

Normal LA  size by volume 22+/-6 ml/m2.

The right atrium is normal in size.

1.5mg of Definity was utilized for enhancement of images

Aortic valve is trileaflet and is mildly thickened.

Mild mitral regurgitation is present.

Mild tricuspid regurgitation present.   Right ventricular systolic pressure is normal at < 35 mmHg.

The pulmonic valve was not well visualized.

The aortic root size is normal.

IVC Not well visulized.

There is no pericardial effusion.



CONCLUSIONS

--------

1. This was a technically difficult study with suboptimal apical views.

2. The left ventricular size is normal.

3. There is severe concentric left ventricular hypertrophy.

4. Overall left ventricular systolic function is normal with, an EF between 65 - 70 %.

5. 1.5mg of Definity was utilized for enhancement of images

6. Aortic valve is trileaflet and is mildly thickened.

7. Mild mitral regurgitation is present.

8. Mild tricuspid regurgitation present.

9. There is no pericardial effusion.





SONOGRAPHER: Sinai Pierson RDCS

## 2020-09-23 VITALS — SYSTOLIC BLOOD PRESSURE: 93 MMHG | TEMPERATURE: 98.2 F | DIASTOLIC BLOOD PRESSURE: 75 MMHG | HEART RATE: 67 BPM

## 2020-09-23 VITALS — RESPIRATION RATE: 18 BRPM

## 2020-09-23 LAB
ANION GAP SERPL CALC-SCNC: 3 MMOL/L
BUN SERPL-SCNC: 25 MG/DL (ref 9–20)
CALCIUM SPEC-MCNC: 8.8 MG/DL (ref 8.4–10.2)
CHLORIDE SERPL-SCNC: 106 MMOL/L (ref 98–107)
CO2 SERPL-SCNC: 29 MMOL/L (ref 22–30)
GLUCOSE SERPL-MCNC: 103 MG/DL (ref 74–99)
POTASSIUM SERPL-SCNC: 4.3 MMOL/L (ref 3.5–5.1)
SODIUM SERPL-SCNC: 138 MMOL/L (ref 137–145)

## 2020-09-23 RX ADMIN — CEPHALEXIN SCH MG: 500 CAPSULE ORAL at 11:39

## 2020-09-23 RX ADMIN — PANTOPRAZOLE SODIUM SCH: 40 TABLET, DELAYED RELEASE ORAL at 08:33

## 2020-09-23 RX ADMIN — APIXABAN SCH MG: 5 TABLET, FILM COATED ORAL at 08:34

## 2020-09-23 RX ADMIN — LOSARTAN POTASSIUM SCH MG: 50 TABLET, FILM COATED ORAL at 08:34

## 2020-09-23 RX ADMIN — DILTIAZEM HYDROCHLORIDE SCH MLS/HR: 5 INJECTION INTRAVENOUS at 00:05

## 2020-09-23 RX ADMIN — DILTIAZEM HYDROCHLORIDE SCH: 5 INJECTION INTRAVENOUS at 10:55

## 2020-09-23 RX ADMIN — FAMOTIDINE SCH: 20 TABLET, FILM COATED ORAL at 08:33

## 2020-09-23 RX ADMIN — CEPHALEXIN SCH MG: 500 CAPSULE ORAL at 08:34

## 2020-09-23 NOTE — P.DS
Providers


Date of admission: 


09/22/20 13:25





Expected date of discharge: 09/23/20


Attending physician: 


Rajesh Pierce





Consults: 





                                        





09/21/20 17:31


Consult Physician Urgent 


   Consulting Provider: [facility]


   Consult Reason/Comments: New-onset A. fib with AVR, chest pain


   Do you want consulting provider notified?: Yes











Primary care physician: 


Gurpreet Dickinson





[facility] Course: 





HISTORY OF PRESENT ILLNESS


55-year-old mildly overweight male one of Dr. Dickinson patient with past 

medical history of hypertension, hyperlipidemia, obstructive sleep apnea does 

not use his CPAP, mild obesity, hyperglycemia who has been complaining for 

arrhythmia on and off for a few years in the last 2 weeks become much worse 

according to him yesterday patient developed to have quieted arrhythmia with 

sweating and felt quite bit hydrated despite drinking enough water symptom did 

not improve with refused to come to demurs department at the time ended up 

resting in the symptom this appeared.  Patient felt better this morning as is 

getting ready to go to work developed to have midsternal pain and discomfort 

radiating toward the jaw area on the left upper side of his chest wall his 

symptoms become much worse associated with palpitation and lightheadedness 

nausea and cold sweat.  Patient ended up coming to the emergency department at 

[facility] with the Na problem was seen and evaluated CK with 

troponin came back negative patient found to be in A. fib with RVR pulse rate 

running over 160 beats per minutes.  Patient was started on Cardizem drip along 

with heparin drip will be admitted to the hospital his CK with troponin 2 was 

negative so far patient be seeing cardiology and continue aggressive medical 

management with his presentation specially feeling presyncope when he had a 

chest pain patient might need to go for heart cath.





9/22: Patient denies any chest pain, shortness of breath, palpitations.  He is 

still on Cardizem drip and heparin drip. Repeat CBC is unremarkable.  

Triglycerides 72, cholesterol 166, , HDL 42.  Troponins have been 

negative on 3 draws.  Repeat chemistry ordered for tomorrow.  Patient has been 

seen by cardiology with plan to continue Cardizem drip and stop heparin drip and

start the patient on oral anticoagulation and metoprolol. We have ordered 

echocardiogram.





9/23: Patient was on Cardizem drip this morning at 5 mg per hour.  We'll 

increase Lopressor to 50 mg twice daily.  Cardiac monitor is atrial fibrillation

with controlled rate.  Blood pressure 119/77, heart rate 104, pulse ox 96% on 

room air, afebrile.  Patient has been instructed to stop Adipex.  Echocardiogram

reveals EF of 65-70%, severe concentric left ventricular hypertrophy, mild 

mitral regurgitation, mild tricuspid regurgitation. Patient has been seen by 

cardiology and cleared for discharge.  The patient denies having any chest pain,

shortness of breath, lightheadedness or dizziness.  No palpitations.  Patient 

will be discharged home today in stable condition.





ASSESSMENT AND PLAN


1. Atrial fibrillation with RVR, paroxysmal atrial fibrillation.  


2. Chest pain, acute coronary syndrome ruled out.


3. Obstructive sleep apnea.  


4. Hyperlipidemia. 


5. Acute kidney injury. 


6. GERD. 





Discharge plan: home.





Impression and plan of care have been directed as dictated by the signing 

physician.  Evangelina Temple nurse practitioner acting as scribe for signing 

physician.





Patient Condition at Discharge: Good





Plan - Discharge Summary


New Discharge Prescriptions: 


New


   Apixaban [Eliquis] 5 mg PO BID #60 tab


   Metoprolol Tartrate [Lopressor] 50 mg PO BID #60 tab





Continue


   Sildenafil Citrate 100 mg PO DAILY PRN


     PRN Reason: E.D.


   Losartan Potassium [Cozaar] 100 mg PO DAILY


   Lansoprazole 30 mg PO BID


   Cephalexin [Keflex] 500 mg PO QID


   traMADol HCl [Ultram] 50 mg PO TID PRN


     PRN Reason: Pain





Discontinued


   Phentermine HCl 37.5 mg PO AC-BRKFST


   Ibuprofen 800 mg PO Q8H PRN


     PRN Reason: Pain


Discharge Medication List





Cephalexin [Keflex] 500 mg PO QID 09/21/20 [History]


Lansoprazole 30 mg PO BID 09/21/20 [History]


Losartan Potassium [Cozaar] 100 mg PO DAILY 09/21/20 [History]


Sildenafil Citrate 100 mg PO DAILY PRN 09/21/20 [History]


traMADol HCl [Ultram] 50 mg PO TID PRN 09/21/20 [History]


Apixaban [Eliquis] 5 mg PO BID #60 tab 09/23/20 [Rx]


Metoprolol Tartrate [Lopressor] 50 mg PO BID #60 tab 09/23/20 [Rx]








Follow up Appointment(s)/Referral(s): 


Chandrakant Mccarty MD [STAFF PHYSICIAN] - 1 Week


Gurpreet Dickinson DO [Primary Care Provider] - 1 Week


Discharge Disposition: HOME SELF-CARE

## 2020-09-23 NOTE — P.PN
Subjective


Progress Note Date: 09/23/20


This is a 55-year-old  gentleman with past medical history significant 

for obesity, obstructive sleep apnea, hypertension, history of heart racing and 

fluttering who presented to the hospital with symptoms of chest discomfort which

the patient described as a tightness in the center of his chest.  He also 

developed symptoms of heart racing and fluttering associated with some dizziness

and lightheadedness.  On arrival here the patient was found to be in atrial 

fibrillation with rapid ventricular response.  Patient does have obstructive 

sleep apnea for the past one year has not been using his CPAP machine.  He 

continues at this point in time to be on IV Cardizem as well as IV heparin.  

This morning the patient continues to be in atrial fibrillation although his 

heart rate is under much better control.  An echocardiogram with Doppler study 

was performed which revealed a normal left ventricular systolic function.  The 

patient was seen and examined this morning, breathing overall is stable, denied 

any chest discomfort or heart racing.








Objective





- Vital Signs


Vital signs: 


                                   Vital Signs











Temp  98.3 F   09/23/20 08:00


 


Pulse  104 H  09/23/20 08:00


 


Resp  18   09/23/20 08:00


 


BP  119/77   09/23/20 08:00


 


Pulse Ox  96   09/23/20 08:00








                                 Intake & Output











 09/22/20 09/23/20 09/23/20





 18:59 06:59 18:59


 


Intake Total 1203.607 125 529.333


 


Output Total  120 


 


Balance 1203.607 5 529.333


 


Weight  143.9 kg 


 


Intake:   


 


  IV 10  


 


    Invasive Line 2 10  


 


  Intake, IV Titration 253.607 125 71.333





  Amount   


 


    Diltiazem 125 mg In 110 125 71.333





    Sodium Chloride 0.9% 100   





    ml @ 10 MG/HR 10 mls/hr   





    IV .[id number] HUGO Rx#:   





    [id number]   


 


    Heparin Sod,Pork in 0.45% 143.607  





    NaCl 25,000 unit In 0.45   





    % NaCl 1 250ml.bag @ 6.   





    954 UNITS/KG/HR 9.999 mls   





    /hr IV .Q24H HUGO Rx#:   





    [id number]   


 


  Oral 940  458


 


Output:   


 


  Urine  120 


 


Other:   


 


  Voiding Method Toilet Toilet 


 


  # Voids 4 1 














- Exam


PHYSICAL EXAMINATION: 





GENERAL: 55-year-old  gentleman in no acute distress at the time of my 

examination





HEENT: Head is atraumatic, normocephalic.  Pupils equal, round.  Sclera 

anicteric. Conjunctiva are clear.  Mucous membranes of the mouth are moist.  

Neck is supple.  There is no elevated jugular venous pressure.  No carotid  

bruit is heard.





HEART EXAMINATION: Heart S1 and S2 irregularly irregular





CHEST EXAMINATION: Lungs reveal fine wheezing on expiration . No chest wall 

tenderness is noted on palpation or with deep breathing.





ABDOMEN:  Soft, obese, nontender. Bowel sounds are heard. No organomegaly noted.


 


EXTREMITIES: 2+ peripheral pulses with trace  evidence of peripheral edema and 

no calf tenderness noted.





NEUROLOGIC patient is awake, alert and oriented 3 .


 


.


 











- Labs


CBC & Chem 7: 


                                 09/22/20 07:57





                                 09/23/20 07:36


Labs: 


                  Abnormal Lab Results - Last 24 Hours (Table)











  09/23/20 Range/Units





  07:36 


 


BUN  25 H  (9-20)  mg/dL


 


Creatinine  1.30 H  (0.66-1.25)  mg/dL


 


Glucose  103 H  (74-99)  mg/dL














Assessment and Plan


Plan: 


Assessment and plan





#1 persistent atrial fibrillation


#2 atypical chest discomfort with negative troponins


#3 obesity


#4 obstructive sleep apnea


#5 hypertension





Plan


We will discontinue the IV Cardizem and increase the patient's dose of beta 

blocker.  Continue Eliquis.  Okay to discharge home from cardiology's 

perspective, we will follow-up in the office and perform outpatient stress 

testing.





DNP note has been reviewed, I agree with a documented findings and plan of care.

 Patient was seen and examined.

## 2020-10-16 ENCOUNTER — HOSPITAL ENCOUNTER (OUTPATIENT)
Dept: HOSPITAL 47 - CATHCVL | Age: 55
End: 2020-10-16
Attending: INTERNAL MEDICINE
Payer: COMMERCIAL

## 2020-10-16 VITALS — SYSTOLIC BLOOD PRESSURE: 114 MMHG | DIASTOLIC BLOOD PRESSURE: 82 MMHG | HEART RATE: 120 BPM

## 2020-10-16 VITALS — TEMPERATURE: 98 F | RESPIRATION RATE: 18 BRPM

## 2020-10-16 VITALS — BODY MASS INDEX: 44.6 KG/M2

## 2020-10-16 DIAGNOSIS — E66.01: ICD-10-CM

## 2020-10-16 DIAGNOSIS — G47.33: ICD-10-CM

## 2020-10-16 DIAGNOSIS — I10: ICD-10-CM

## 2020-10-16 DIAGNOSIS — Z79.01: ICD-10-CM

## 2020-10-16 DIAGNOSIS — I48.0: Primary | ICD-10-CM

## 2020-10-16 DIAGNOSIS — Z79.899: ICD-10-CM

## 2020-10-16 DIAGNOSIS — Z91.048: ICD-10-CM

## 2020-10-16 PROCEDURE — 92960 CARDIOVERSION ELECTRIC EXT: CPT

## 2020-10-16 PROCEDURE — 93320 DOPPLER ECHO COMPLETE: CPT

## 2020-10-16 PROCEDURE — 93312 ECHO TRANSESOPHAGEAL: CPT

## 2020-10-16 PROCEDURE — 93325 DOPPLER ECHO COLOR FLOW MAPG: CPT

## 2020-10-16 RX ADMIN — BENZOCAINE ONE SPRAY: 200 SPRAY DENTAL; ORAL; PERIODONTAL at 07:46

## 2020-10-16 RX ADMIN — BENZOCAINE ONE SPRAY: 200 SPRAY DENTAL; ORAL; PERIODONTAL at 07:29

## 2020-10-16 NOTE — ECHOT
TRANSESOPHAGEAL ECHOCARDIOGRAM



DATE OF SERVICE:

10/16/2020



PERFORMING PHYSICIAN:

Chandrakant Mccarty MD.



PROCEDURE PERFORMED:

Transesophageal echocardiogram.



INDICATION:

Atrial fibrillation and cardioversion.



The transesophageal echocardiogram is to rule out any intracardiac thrombus.



COMPLICATION:

None.



LEVEL OF SEDATION:

The procedure was performed under general anesthesia.



PROCEDURE DESCRIPTION:

After obtaining informed consent, the patient was brought to the recovery room.  A

pulse oximetry and heart rate monitors were attached to the patient.  Subsequently, the

patient was turned into left lateral position.  The patient's throat was sprayed using

lidocaine.



Subsequently, the transesophageal echocardiogram probe was advanced through the bite

guard to the mid esophageal where 2D echocardiogram images as well as color Doppler

images of various cardiac structures were obtained.  Particular attention was made to

the left atrial appendage.



The TUCKER was performed using a 2D echocardiogram images, color Doppler, continuous-wave

Doppler, and pulse with Dopplers as well.



FINDINGS:

The study overall technically was very, VERY difficult.  The left ventricular dimension

appeared to be within normal limits and the left ventricular systolic function appeared

to be normal as well.  The right ventricle appeared to be dilated.  The left atrium

appeared to be dilated as well.  The left atrial appendage appeared to be free from any

thrombus.  The interatrial septum appeared to be intact with color-flow Doppler without

any agitated saline/contrast study.  The mitral valve appeared to be thickened with

moderate MR.  Also, there was moderate tricuspid regurgitation.  The aortic valve is

trileaflet valve without stenosis or regurgitation.



CONCLUSION:

1. Normal left ventricular dimension and systolic function.

2. Concentric left ventricular hypertrophy.

3. Dilated right ventricle with normal function.

4. Moderate biatrial enlargement.

5. Intact interatrial septum.

6. Moderate mitral regurgitation.

7. Moderate tricuspid regurgitation.

8. Trileaflet aortic valve without stenosis or regurgitation.

9. No evidence of pericardial effusion.





MMODL / IJN: [id number] / Job#: [id number]

## 2020-10-16 NOTE — CE
CARDIAC ELECTROPHYSIOLOGY REPORT



DATE OF SERVICE:

10/16/2020



PERFORMING PHYSICIAN:

Chandrakant Mccarty MD.



PROCEDURE PERFORMED:

Cardioversion.



COMPLICATION:

None.



LEVEL OF SEDATION:

The procedure was performed under general anesthesia with deep sedation.



PROCEDURE DESCRIPTION:

After transesophageal echocardiogram was performed and intracardiac thrombus was ruled

out, we pursued cardioversion.  we attempted doing cardioversion 4 times.  The

cardioversion was performed initially under 100, 200, 300, and 360 joule.  Every time

the patient converted to sinus rhythm for few seconds AND came back to atrial

fibrillation after that.



CONCLUSION:

Attempted cardioversion of atrial fibrillation was performed x4 and was unsuccessful.





MMODL / IJN: [id number] / Job#: [id number]

## 2020-12-04 ENCOUNTER — HOSPITAL ENCOUNTER (OUTPATIENT)
Dept: HOSPITAL 47 - LABPAT | Age: 55
Discharge: HOME | End: 2020-12-04
Attending: INTERNAL MEDICINE
Payer: COMMERCIAL

## 2020-12-04 DIAGNOSIS — I48.19: ICD-10-CM

## 2020-12-04 DIAGNOSIS — Z01.818: Primary | ICD-10-CM

## 2020-12-04 LAB
ANION GAP SERPL CALC-SCNC: 5 MMOL/L
BUN SERPL-SCNC: 17 MG/DL (ref 9–20)
CHLORIDE SERPL-SCNC: 110 MMOL/L (ref 98–107)
CO2 SERPL-SCNC: 25 MMOL/L (ref 22–30)
ERYTHROCYTE [DISTWIDTH] IN BLOOD BY AUTOMATED COUNT: 4.6 M/UL (ref 4.3–5.9)
ERYTHROCYTE [DISTWIDTH] IN BLOOD: 13.7 % (ref 11.5–15.5)
HCT VFR BLD AUTO: 42.5 % (ref 39–53)
HGB BLD-MCNC: 14.4 GM/DL (ref 13–17.5)
MAGNESIUM SPEC-SCNC: 1.9 MG/DL (ref 1.6–2.3)
MCH RBC QN AUTO: 31.3 PG (ref 25–35)
MCHC RBC AUTO-ENTMCNC: 33.9 G/DL (ref 31–37)
MCV RBC AUTO: 92.4 FL (ref 80–100)
PLATELET # BLD AUTO: 231 K/UL (ref 150–450)
POTASSIUM SERPL-SCNC: 4.8 MMOL/L (ref 3.5–5.1)
SODIUM SERPL-SCNC: 140 MMOL/L (ref 137–145)
WBC # BLD AUTO: 8 K/UL (ref 3.8–10.6)

## 2020-12-04 PROCEDURE — 85027 COMPLETE CBC AUTOMATED: CPT

## 2020-12-04 PROCEDURE — 82565 ASSAY OF CREATININE: CPT

## 2020-12-04 PROCEDURE — 83735 ASSAY OF MAGNESIUM: CPT

## 2020-12-04 PROCEDURE — 80051 ELECTROLYTE PANEL: CPT

## 2020-12-04 PROCEDURE — 84520 ASSAY OF UREA NITROGEN: CPT

## 2020-12-17 ENCOUNTER — HOSPITAL ENCOUNTER (OUTPATIENT)
Dept: HOSPITAL 47 - CATHEP | Age: 55
LOS: 1 days | Discharge: HOME | End: 2020-12-18
Attending: INTERNAL MEDICINE
Payer: COMMERCIAL

## 2020-12-17 VITALS — BODY MASS INDEX: 45.3 KG/M2

## 2020-12-17 VITALS — RESPIRATION RATE: 18 BRPM

## 2020-12-17 DIAGNOSIS — E66.8: ICD-10-CM

## 2020-12-17 DIAGNOSIS — Z79.01: ICD-10-CM

## 2020-12-17 DIAGNOSIS — I11.9: ICD-10-CM

## 2020-12-17 DIAGNOSIS — K08.409: ICD-10-CM

## 2020-12-17 DIAGNOSIS — R60.9: ICD-10-CM

## 2020-12-17 DIAGNOSIS — K21.9: ICD-10-CM

## 2020-12-17 DIAGNOSIS — G47.33: ICD-10-CM

## 2020-12-17 DIAGNOSIS — Z79.899: ICD-10-CM

## 2020-12-17 DIAGNOSIS — J45.909: ICD-10-CM

## 2020-12-17 DIAGNOSIS — I08.1: ICD-10-CM

## 2020-12-17 DIAGNOSIS — Z98.890: ICD-10-CM

## 2020-12-17 DIAGNOSIS — Z91.048: ICD-10-CM

## 2020-12-17 DIAGNOSIS — Z99.89: ICD-10-CM

## 2020-12-17 DIAGNOSIS — Z91.09: ICD-10-CM

## 2020-12-17 DIAGNOSIS — I48.19: Primary | ICD-10-CM

## 2020-12-17 PROCEDURE — 93657 TX L/R ATRIAL FIB ADDL: CPT

## 2020-12-17 PROCEDURE — 92960 CARDIOVERSION ELECTRIC EXT: CPT

## 2020-12-17 PROCEDURE — 93613 INTRACARDIAC EPHYS 3D MAPG: CPT

## 2020-12-17 PROCEDURE — 93656 COMPRE EP EVAL ABLTJ ATR FIB: CPT

## 2020-12-17 PROCEDURE — 93662 INTRACARDIAC ECG (ICE): CPT

## 2020-12-17 PROCEDURE — 85347 COAGULATION TIME ACTIVATED: CPT

## 2020-12-17 RX ADMIN — HYDROCODONE BITARTRATE AND ACETAMINOPHEN PRN EACH: 5; 325 TABLET ORAL at 20:39

## 2020-12-17 RX ADMIN — CEFAZOLIN SCH: 330 INJECTION, POWDER, FOR SOLUTION INTRAMUSCULAR; INTRAVENOUS at 20:34

## 2020-12-17 RX ADMIN — ACETAMINOPHEN PRN MG: 325 TABLET, FILM COATED ORAL at 23:42

## 2020-12-17 RX ADMIN — APIXABAN SCH MG: 5 TABLET, FILM COATED ORAL at 20:39

## 2020-12-17 RX ADMIN — METOPROLOL TARTRATE SCH MG: 50 TABLET, FILM COATED ORAL at 20:38

## 2020-12-17 RX ADMIN — PANTOPRAZOLE SODIUM SCH MG: 40 TABLET, DELAYED RELEASE ORAL at 20:39

## 2020-12-18 VITALS — DIASTOLIC BLOOD PRESSURE: 78 MMHG | TEMPERATURE: 98.8 F | SYSTOLIC BLOOD PRESSURE: 132 MMHG

## 2020-12-18 VITALS — HEART RATE: 90 BPM

## 2020-12-18 RX ADMIN — METOPROLOL TARTRATE SCH MG: 50 TABLET, FILM COATED ORAL at 09:42

## 2020-12-18 RX ADMIN — APIXABAN SCH MG: 5 TABLET, FILM COATED ORAL at 09:42

## 2020-12-18 RX ADMIN — HYDROCODONE BITARTRATE AND ACETAMINOPHEN PRN EACH: 5; 325 TABLET ORAL at 01:52

## 2020-12-18 RX ADMIN — PANTOPRAZOLE SODIUM SCH MG: 40 TABLET, DELAYED RELEASE ORAL at 09:42

## 2020-12-18 RX ADMIN — ACETAMINOPHEN PRN MG: 325 TABLET, FILM COATED ORAL at 05:51

## 2020-12-18 RX ADMIN — CEFAZOLIN SCH MLS/HR: 330 INJECTION, POWDER, FOR SOLUTION INTRAMUSCULAR; INTRAVENOUS at 01:48

## 2020-12-21 NOTE — P.EPPROC
- EP Procedure Note


Electrophysiology Procedure Note: 





Procedure


A. fib ablation





Diagnosis


Atrial fibrillation, symptomatic, refractory to therapy, persistent





Result


No left atrial appendage mass seen on intracardiac echo


Successful A. fib ablation/pulmonary vein isolation of all veins using cryo-

ablation


Complete entrance block in all 4 veins confirmed


Linear ablation along the left atrial roof


Linear ablation along the septum of the left atrium


Urine ablation in the ridge in juxtaposition to the posterior border of the left

atrial appendage, in the region between the appendage and the left-sided veins


No evidence for phrenic nerve injury


Esophageal deflection  YES 


Electrical cardioversion with a synchronized shock across the chest   YES 





Procedure details


Patient was brought to the EP lab in a fasting state. Written informed consent 

was obtained prior to the procedure.  Procedure performed under general 

anesthesia


After initial muscle relaxant use, muscle relaxants were not given thereafter in

order to assess phrenic nerve during procedure.  Patient prepped and draped as 

per protocol


Full cryo-set up with standard preparation of the cryoablation tools done.  

Femoral Venous access obtained on the right and left groins


Venous and arterial Sheaths placed.  Diagnostic catheters for the high right 

atrium, phrenic nerve stimulation and pacing, His bundle, RV and coronary sinus 

placed


Intracardiac echo catheter placed.  Long sheath placed in the right atrium


Left and right transseptal catheterization performed under intracardiac echo 

guidance.  Intravenous heparin with aCT above 300


Later, catheter positioning and balloon positioning in the left atrium, under 

intracardiac echo guidance





Diagnostic EP study  


Coronary sinus pacing and recording





Baseline measurements: Sinus cycle length 1117, IA interval 163, , 


AH 59 and HV interval 37





Atrial pacing performed from the high right atrium and the coronary sinus





Transseptal catheterization performed


RA pressure 19/10/15


LA pressure  24/10/18





Transseptal catheterization performed with standard sheath.  The cryoablation 

sheath was then placed with an over the wire exchange without any acute 

complications.


All 4 pulmonary veins were isolated in the following sequence: Left superior 

followed by left inferior followed by right superior followed by right inferior


The cryo-ablation balloon was placed at the os of each vein


1.5 mL of IV dye was injected to confirm an occluded vein


Goal during cryoablation was to achieve complete occlusion of the pulmonary 

vein, achieve -30 degrees C at 30 seconds and achieve -40 degrees C at 60 

seconds and a time to effect of less than 60-90 seconds, .  If not the balloon 

was repositioned to obtain this result


After completion of Cryoblation with durations from 180-240 seconds, entrance 

block was confirmed with the Attain circular catheter in a roving fashion around

the antrum of the pulmonary veins


Phrenic nerve pacing was performed from the SVC, right innominate vein area and 

diaphragm voltage was monitored.  Diaphragmatic contractions were also monitored

manually for strength of contraction.





Parameter goals for each cryo freeze


Complete occlusion of the appropriate  vein


-30 degrees C by 30 seconds


-40 degrees C by 60 seconds


Minimum between minus 40-55 degrees C


Thaw time greater than 10 seconds


Balloon visualized by intracardiac echo 


The esophagus was intubated.  Esophageal Temperature monitoring with a CIRCA 

catheter formed.  Esophageal deflection for hypothermia of the esophagus below 

30 degrees C





Left superior pulmonary vein


Complete isolation, entrance block





Left inferior pulmonary vein


Complete isolation, entrance block 





Right superior pulmonary vein, during phrenic nerve pacing


Complete isolation, entrance block





Right inferior pulmonary vein, during phrenic nerve pacing


Complete isolation, entrance block





At the end of the procedure the Achieve catheter was once again used to check 

for entrance block


Phrenic nerve stimulation was performed to confirm diaphragmatic stimulation the

end of the procedure


Cine fluoroscopy was performed at the very end of the procedure to confirm 

movement of both diaphragms with inspiration and expiration.





The cryo sheaths were exchanged for standard RF sheaths


RF cath was placed in the left atrium


3-D electro-anatomic mapping was performed


Voltage mapping was performed


Pulmonary veins are completely isolated


Linear ablation was performed in the roof.  This was a short segment of about 

less than 2.5 cm


A complete anatomic line of block was made which later was evaluated with 

voltage mapping


Linear ablation was performed along the septum from the roof down to the 

inferior pulmonary vein and a complete line of block was made and voltage 

mapping was performed in sinus rhythm at the end of the procedure


RF ablation was also performed in a linear fashion in the range between the left

atrial appendage and the left pulmonary veins.  There are times during when 

ablating this range when transiently they would be colonization 20 atrial 

tachycardia


However these episodes were brief and the patient go back into atrial 

fibrillation


At the end of the procedure electrical cardioversion was performed and the 

patient is converted to sinus rhythm





At the end of the procedure the patient was extubated


Heparin was reversed


Venous sheaths were removed and hemostasis assured





Procedures performed (PVI -and linear ablation


Diagnostic EP study 


CS pacing and recording


Left and right transseptal catheterization


 3D mapping)


Intracardiac echocardiography


Pulmonary vein isolation with transseptal and comprehensive EPS, [id number]


Left atrial roof line, +[id number]


Linear ablation, left atrium septum , +[phone]


Linear ablation along the ridge from the left superior to the left inferior just

along the left atrial appendage posterior border


Electrical cardioversion with a synchronized shock across the chest 00660

## 2020-12-22 NOTE — P.DS
Providers


Attending physician: 


Wes Jeffries





Primary care physician: 


[facility] Course: 


Pt is s/p atrial fibrillation ablation and pulmonary vein isolation. Telemetry 

tracings and EKG this morning reveal sinus rhythm. Blood pressure 132/78 heart 

rate 90. He denies chest pain, shortness of breath, dizziness or palpitations. 





GENERAL: Well-appearing, well-nourished and in no acute distress.


NECK: Supple without JVD or thyromegaly.


LUNGS: Breath sounds clear to auscultation bilaterally. Respiration equal and 

unlabored.  No wheezes, rales or rhonchi.


HEART: Regular rate and rhythm without murmurs, rubs or gallops. S1 and S2 

heard.


EXTREMITIES: Normal range of motion, no edema.  No clubbing or cyanosis. 

Peripheral pulses intact. 





ASSESSMENT


Atrial fibrillation, persistent. Refractory to medical therapy





PLAN


Decrease lopressor to 50 mg BID.


Continue eliquis with no interruptions. 


Await return to work until follow up appointment with Dr. Mccarty. 





Nurse Practitioner note has been reviewed, I agree with a documented findings 

and plan of care.  Patient was seen and examined.





Patient Condition at Discharge: Stable





Plan - Discharge Summary


Discharge Rx Participant: Yes


New Discharge Prescriptions: 


New


   Metoprolol Tartrate [Lopressor] 50 mg PO BID  tab





Continue


   Lansoprazole 30 mg PO BID


   traMADol HCl [Ultram] 50 mg PO TID PRN


     PRN Reason: Pain


   Apixaban [Eliquis] 5 mg PO BID #60 tab


   Ibuprofen 800 mg PO Q8H PRN


     PRN Reason: Pain


   Losartan [Cozaar] 25 mg PO DAILY


   Acetaminophen [Tylenol Extra Strength] 1,000 mg PO Q4H PRN


     PRN Reason: Pain





Discontinued


   Metoprolol Tartrate [Lopressor] 50 mg PO TID


   Aspirin 325 mg PO DAILY PRN


     PRN Reason: Pain


Discharge Medication List





Lansoprazole 30 mg PO BID 09/21/20 [History]


traMADol HCl [Ultram] 50 mg PO TID PRN 09/21/20 [History]


Apixaban [Eliquis] 5 mg PO BID #60 tab 09/23/20 [Rx]


Ibuprofen 800 mg PO Q8H PRN 10/14/20 [History]


Acetaminophen [Tylenol Extra Strength] 1,000 mg PO Q4H PRN 12/15/20 [History]


Losartan [Cozaar] 25 mg PO DAILY 12/15/20 [History]


Metoprolol Tartrate [Lopressor] 50 mg PO BID  tab 12/18/20 [Rx]








Follow up Appointment(s)/Referral(s): 


Chandrakant Mccarty MD [STAFF PHYSICIAN] - 12/22/20 1:30 pm (at the [address] [place] [address] 

location/ [facility])


Patient Instructions/Handouts:  Cardiac Ablation (DC)


Discharge Disposition: HOME SELF-CARE


Plan of Treatment: 


As per discharge written Ablation instructions

## 2021-03-31 ENCOUNTER — HOSPITAL ENCOUNTER (OUTPATIENT)
Dept: HOSPITAL 47 - LABWHC1 | Age: 56
Discharge: HOME | End: 2021-03-31
Attending: FAMILY MEDICINE
Payer: COMMERCIAL

## 2021-03-31 DIAGNOSIS — U07.1: Primary | ICD-10-CM

## 2022-10-25 ENCOUNTER — HOSPITAL ENCOUNTER (OUTPATIENT)
Dept: HOSPITAL 47 - LABWHC1 | Age: 57
Discharge: HOME | End: 2022-10-25
Attending: FAMILY MEDICINE
Payer: COMMERCIAL

## 2022-10-25 DIAGNOSIS — J20.9: ICD-10-CM

## 2022-10-25 DIAGNOSIS — L03.90: ICD-10-CM

## 2022-10-25 DIAGNOSIS — Z00.00: Primary | ICD-10-CM

## 2022-10-25 DIAGNOSIS — Z12.5: ICD-10-CM

## 2022-10-25 LAB
ALBUMIN SERPL-MCNC: 4.1 G/DL (ref 3.8–4.9)
ALBUMIN/GLOB SERPL: 1.3 G/DL (ref 1.6–3.17)
ALP SERPL-CCNC: 100 U/L (ref 41–126)
ALT SERPL-CCNC: 44 U/L (ref 10–49)
ANION GAP SERPL CALC-SCNC: 11.5 MMOL/L (ref 10–18)
AST SERPL-CCNC: 35 U/L (ref 14–35)
BASOPHILS # BLD AUTO: 0.04 X 10*3/UL (ref 0–0.1)
BASOPHILS NFR BLD AUTO: 0.4 %
BUN SERPL-SCNC: 19.3 MG/DL (ref 9–27)
BUN/CREAT SERPL: 14.09 RATIO (ref 12–20)
CALCIUM SPEC-MCNC: 8.9 MG/DL (ref 8.7–10.3)
CHLORIDE SERPL-SCNC: 103 MMOL/L (ref 96–109)
CHOLEST SERPL-MCNC: 172 MG/DL (ref 0–200)
CO2 SERPL-SCNC: 22.9 MMOL/L (ref 20–27.5)
EOSINOPHIL # BLD AUTO: 0.39 X 10*3/UL (ref 0.04–0.35)
EOSINOPHIL NFR BLD AUTO: 4.1 %
ERYTHROCYTE [DISTWIDTH] IN BLOOD BY AUTOMATED COUNT: 4.14 X 10*6/UL (ref 4.4–5.6)
ERYTHROCYTE [DISTWIDTH] IN BLOOD: 12.3 % (ref 11.5–14.5)
GLOBULIN SER CALC-MCNC: 3.1 G/DL (ref 1.6–3.3)
GLUCOSE SERPL-MCNC: 99 MG/DL (ref 70–110)
HCT VFR BLD AUTO: 37.9 % (ref 39.6–50)
HDLC SERPL-MCNC: 38.1 MG/DL (ref 40–60)
HGB BLD-MCNC: 13.2 G/DL (ref 13–17)
IMM GRANULOCYTES BLD QL AUTO: 0.3 %
LDLC SERPL CALC-MCNC: 113.7 MG/DL (ref 0–131)
LYMPHOCYTES # SPEC AUTO: 0.78 X 10*3/UL (ref 0.9–5)
LYMPHOCYTES NFR SPEC AUTO: 8.3 %
MCH RBC QN AUTO: 31.9 PG (ref 27–32)
MCHC RBC AUTO-ENTMCNC: 34.8 G/DL (ref 32–37)
MCV RBC AUTO: 91.5 FL (ref 80–97)
MONOCYTES # BLD AUTO: 0.8 X 10*3/UL (ref 0.2–1)
MONOCYTES NFR BLD AUTO: 8.5 %
NEUTROPHILS # BLD AUTO: 7.37 X 10*3/UL (ref 1.8–7.7)
NEUTROPHILS NFR BLD AUTO: 78.4 %
NRBC BLD AUTO-RTO: 0 /100 WBCS (ref 0–0)
PLATELET # BLD AUTO: 245 X 10*3/UL (ref 140–440)
POTASSIUM SERPL-SCNC: 4.2 MMOL/L (ref 3.5–5.5)
PROT SERPL-MCNC: 7.2 G/DL (ref 6.2–8.2)
PSA SERPL-MCNC: 1.4 NG/ML (ref 0–4)
SODIUM SERPL-SCNC: 138 MMOL/L (ref 135–145)
T4 FREE SERPL-MCNC: 1.22 NG/DL (ref 0.8–1.8)
TRIGL SERPL-MCNC: 101 MG/DL (ref 0–149)
VLDLC SERPL CALC-MCNC: 20.2 MG/DL (ref 5–40)
WBC # BLD AUTO: 9.41 X 10*3/UL (ref 4.5–10)

## 2022-10-25 PROCEDURE — 84443 ASSAY THYROID STIM HORMONE: CPT

## 2022-10-25 PROCEDURE — 36415 COLL VENOUS BLD VENIPUNCTURE: CPT

## 2022-10-25 PROCEDURE — 85025 COMPLETE CBC W/AUTO DIFF WBC: CPT

## 2022-10-25 PROCEDURE — 80053 COMPREHEN METABOLIC PANEL: CPT

## 2022-10-25 PROCEDURE — 85379 FIBRIN DEGRADATION QUANT: CPT

## 2022-10-25 PROCEDURE — 84439 ASSAY OF FREE THYROXINE: CPT

## 2022-10-25 PROCEDURE — 80061 LIPID PANEL: CPT

## 2022-10-25 PROCEDURE — 83036 HEMOGLOBIN GLYCOSYLATED A1C: CPT

## 2022-11-21 ENCOUNTER — HOSPITAL ENCOUNTER (OUTPATIENT)
Dept: HOSPITAL 47 - RADNMMAIN | Age: 57
Discharge: HOME | End: 2022-11-21
Attending: FAMILY MEDICINE
Payer: COMMERCIAL

## 2022-11-21 DIAGNOSIS — R10.9: Primary | ICD-10-CM

## 2022-11-21 PROCEDURE — 78226 HEPATOBILIARY SYSTEM IMAGING: CPT

## 2022-11-21 NOTE — NM
EXAMINATION TYPE: NM hepatobiliary w EF

 

DATE OF EXAM: 11/21/2022

 

COMPARISON: NONE

 

HISTORY: R10.9 abdominal pain 

 

 

TECHNIQUE: After the intravenous administration of 4.7 mCi Tc 99m Mebrofenin hepatobiliary scintigrap
hy is performed.  Immediate images post injection.

 

FINDINGS: 

There is satisfactory initial accumulation of tracer by the liver.  The gallbladder is visualized wit
hin 8 minutes.  The small bowel activity is noted within 8 minutes.  At one hour 8 ounces of oral ens
ure plus is given to mimic CCK and gallbladder ejection fraction is calculated at 89 %

 

 

 

IMPRESSION: Correlate for hypercontractile state.

## 2022-12-07 ENCOUNTER — HOSPITAL ENCOUNTER (OUTPATIENT)
Dept: HOSPITAL 47 - OR | Age: 57
Discharge: HOME | End: 2022-12-07
Attending: SURGERY
Payer: COMMERCIAL

## 2022-12-07 VITALS — RESPIRATION RATE: 16 BRPM

## 2022-12-07 VITALS — TEMPERATURE: 97.2 F

## 2022-12-07 VITALS — SYSTOLIC BLOOD PRESSURE: 107 MMHG | HEART RATE: 67 BPM | DIASTOLIC BLOOD PRESSURE: 71 MMHG

## 2022-12-07 DIAGNOSIS — Z99.89: ICD-10-CM

## 2022-12-07 DIAGNOSIS — I10: ICD-10-CM

## 2022-12-07 DIAGNOSIS — K81.0: Primary | ICD-10-CM

## 2022-12-07 DIAGNOSIS — E66.01: ICD-10-CM

## 2022-12-07 DIAGNOSIS — K82.8: ICD-10-CM

## 2022-12-07 DIAGNOSIS — Z98.890: ICD-10-CM

## 2022-12-07 DIAGNOSIS — Z79.01: ICD-10-CM

## 2022-12-07 DIAGNOSIS — Z79.899: ICD-10-CM

## 2022-12-07 DIAGNOSIS — K21.9: ICD-10-CM

## 2022-12-07 DIAGNOSIS — Z79.891: ICD-10-CM

## 2022-12-07 DIAGNOSIS — Z79.1: ICD-10-CM

## 2022-12-07 DIAGNOSIS — M19.90: ICD-10-CM

## 2022-12-07 DIAGNOSIS — Z79.02: ICD-10-CM

## 2022-12-07 DIAGNOSIS — Z96.653: ICD-10-CM

## 2022-12-07 DIAGNOSIS — I48.91: ICD-10-CM

## 2022-12-07 DIAGNOSIS — I25.119: ICD-10-CM

## 2022-12-07 DIAGNOSIS — G47.30: ICD-10-CM

## 2022-12-07 DIAGNOSIS — J45.909: ICD-10-CM

## 2022-12-07 DIAGNOSIS — Z79.83: ICD-10-CM

## 2022-12-07 LAB
BASOPHILS # BLD AUTO: 0.1 K/UL (ref 0–0.2)
BASOPHILS NFR BLD AUTO: 1 %
EOSINOPHIL # BLD AUTO: 0.2 K/UL (ref 0–0.7)
EOSINOPHIL NFR BLD AUTO: 3 %
ERYTHROCYTE [DISTWIDTH] IN BLOOD BY AUTOMATED COUNT: 4.28 M/UL (ref 4.3–5.9)
ERYTHROCYTE [DISTWIDTH] IN BLOOD: 13.2 % (ref 11.5–15.5)
HCT VFR BLD AUTO: 39.9 % (ref 39–53)
HGB BLD-MCNC: 13.4 GM/DL (ref 13–17.5)
LYMPHOCYTES # SPEC AUTO: 1.4 K/UL (ref 1–4.8)
LYMPHOCYTES NFR SPEC AUTO: 20 %
MCH RBC QN AUTO: 31.3 PG (ref 25–35)
MCHC RBC AUTO-ENTMCNC: 33.5 G/DL (ref 31–37)
MCV RBC AUTO: 93.2 FL (ref 80–100)
MONOCYTES # BLD AUTO: 0.3 K/UL (ref 0–1)
MONOCYTES NFR BLD AUTO: 4 %
NEUTROPHILS # BLD AUTO: 5.1 K/UL (ref 1.3–7.7)
NEUTROPHILS NFR BLD AUTO: 70 %
PLATELET # BLD AUTO: 269 K/UL (ref 150–450)
WBC # BLD AUTO: 7.3 K/UL (ref 3.8–10.6)

## 2022-12-07 PROCEDURE — 88304 TISSUE EXAM BY PATHOLOGIST: CPT

## 2022-12-07 PROCEDURE — 85025 COMPLETE CBC W/AUTO DIFF WBC: CPT

## 2022-12-07 PROCEDURE — 47562 LAPAROSCOPIC CHOLECYSTECTOMY: CPT

## 2022-12-07 RX ADMIN — HYDROMORPHONE HYDROCHLORIDE PRN MG: 1 INJECTION, SOLUTION INTRAMUSCULAR; INTRAVENOUS; SUBCUTANEOUS at 11:37

## 2022-12-07 RX ADMIN — HYDROMORPHONE HYDROCHLORIDE PRN MG: 1 INJECTION, SOLUTION INTRAMUSCULAR; INTRAVENOUS; SUBCUTANEOUS at 11:58

## 2022-12-07 NOTE — P.OP
Date of Procedure: 12/07/22


Preoperative Diagnosis: 


Chronic cholecystitis


Postoperative Diagnosis: 


Chronic cholecystitis


Procedure(s) Performed: 


Laparoscopic cholecystectomy


Anesthesia: ROXANNE


Surgeon: Jhon Unger


Estimated Blood Loss (ml): 5


Pathology: other (Gallbladder)


Condition: stable (Gallbladder)


Disposition: PACU


Description of Procedure: 


The patient was placed on the operating table.   The patient received a general 

endotracheal tube anesthesia.    The patients abdomen was prepped and draped in

the usual sterile fashion.    Through an infraumbilical stab incision, the 

fascia of the anterior abdominal wall was grasped with a pair of Kochers and 

then the Veress needle was placed in the peritoneal cavity.   Position of the 

Veress needle was confirmed with positive drop test.   The abdomen was then 

insufflated.  After adequate insufflation, the 10 mm trocar was placed in the 

peritoneal cavity.    Following this the laparoscope was placed in the 

peritoneal cavity. The patient was placed in the head-up, right side up position

and then a 5 mm trocar was placed in the right lateral and right subcostal 

position under direct visualization.    A 8 mm trocar was placed in the 

epigastric position.  The gallbladder was grasped in the fundus and 

infundibulum.  Traction  on the gallbladder was placed in the lateral and the 

cephalad positions.  The triangle of Calot  was visualized..   The cystic duct 

was bluntly dissected until the union of the cystic duct and common bile duct 

was seen.   A critical view of safety was achieved.  The cystic duct was then 

divided and sealed with the Harmonic scissors.  A PDS Endoloop was then placed 

throughout the cystic duct stump.  The cystic artery divided and sealed with the

Harmonic scissors.   The gallbladder was then removed from the liver bed using 

Harmonic scissors.   The gallbladder was then extracted through the epigastric 

port site.  Operative field was checked for any bleeding spots and Harmonic 

scissors was used to coagulate the liver bed.    The abdomen was irrigated.   

The trocars were removed.  The skin was closed using interrupted 3-0 Vicryl 

suture.   Dermabond dressing were applied.   The patient tolerated the procedure

well.

## 2022-12-07 NOTE — P.GSHP
History of Present Illness


H&P Date: 22


Chief Complaint: Right upper quadrant pain





This 49-year-old male who presents today for laparoscopic cholecystectomy.  

Patient's complaints of right quadrant pain.  His recent HIDA scan shows a 

hyperechoic gallbladder consistent with chronic cholecystitis and biliary 

dysfunction.





Past Medical History


Past Medical History: Atrial Fibrillation, Asthma, Chest Pain / Angina, 

GERD/Reflux, Hypertension, Osteoarthritis (OA), Sleep Apnea/CPAP/BIPAP


Additional Past Medical History / Comment(s): CPAP use. SOB. "Blood infection 

where he  3X back in early ."


History of Any Multi-Drug Resistant Organisms: None Reported


Past Surgical History: Joint Replacement, Orthopedic Surgery, Tonsillectomy


Additional Past Surgical History / Comment(s): Bilateral knee replacements, left

compression plate in forearm from injury, deviated septum surgery. cardiac 

ablation for a fib


Past Anesthesia/Blood Transfusion Reactions: No Reported Reaction


Smoking Status: Never smoker





- Past Family History


  ** Mother


Family Medical History: No Reported History





Medications and Allergies


                                Home Medications











 Medication  Instructions  Recorded  Confirmed  Type


 


Lansoprazole 30 mg PO BID 20 History


 


Apixaban [Eliquis] 5 mg PO BID #60 tab 20 Rx


 


Ibuprofen 800 mg PO Q8H PRN 10/14/20 12/07/22 History


 


Acetaminophen [Tylenol Extra 1,000 mg PO Q4H PRN 12/15/20 12/07/22 History





Strength]    


 


Losartan [Cozaar] 25 mg PO DAILY 12/15/20 12/07/22 History


 


Metoprolol Tartrate [Lopressor] 50 mg PO BID  tab 20 Rx


 


Benzonatate 100 mg PO TID PRN 22 History


 


Ondansetron Odt [Zofran Odt] 4 mg PO Q8HR PRN 22 History








                                    Allergies











Allergy/AdvReac Type Severity Reaction Status Date / Time


 


No Known Allergies Allergy   Verified 22 08:53














Surgical - Exam


                                   Vital Signs











Temp Pulse Resp BP Pulse Ox


 


 97.2 F L  65   16   127/77   96 


 


 22 09:04  22 09:04  22 09:04  22 09:04  22 09:04














Obese, BMI 50





- General


well developed, well nourished, no distress





- Eyes


PERRL





- ENT


normal pinna





- Neck


no masses





- Respiratory


normal expansion





- Abdomen





Morbidly obese


Abdomen: soft





Results





- Labs





                                 22 09:11





                  Abnormal Lab Results - Last 24 Hours (Table)











  22 Range/Units





  09:11 


 


RBC  4.28 L  (4.30-5.90)  m/uL














Assessment and Plan


Plan: 





Chronic cholecystitis





Biliary dysfunction





We'll perform laparoscopic cholecystectomy

## 2023-05-16 ENCOUNTER — HOSPITAL ENCOUNTER (OUTPATIENT)
Dept: HOSPITAL 47 - CATHCVL | Age: 58
LOS: 1 days | Discharge: HOME | End: 2023-05-17
Attending: INTERNAL MEDICINE
Payer: COMMERCIAL

## 2023-05-16 DIAGNOSIS — Z79.1: ICD-10-CM

## 2023-05-16 DIAGNOSIS — Z79.01: ICD-10-CM

## 2023-05-16 DIAGNOSIS — I08.0: ICD-10-CM

## 2023-05-16 DIAGNOSIS — I25.9: ICD-10-CM

## 2023-05-16 DIAGNOSIS — Z79.899: ICD-10-CM

## 2023-05-16 DIAGNOSIS — I77.819: ICD-10-CM

## 2023-05-16 DIAGNOSIS — Z91.048: ICD-10-CM

## 2023-05-16 DIAGNOSIS — E78.5: ICD-10-CM

## 2023-05-16 DIAGNOSIS — I25.10: Primary | ICD-10-CM

## 2023-05-16 DIAGNOSIS — Z99.89: ICD-10-CM

## 2023-05-16 DIAGNOSIS — I10: ICD-10-CM

## 2023-05-16 DIAGNOSIS — G47.33: ICD-10-CM

## 2023-05-16 DIAGNOSIS — I48.0: ICD-10-CM

## 2023-05-16 PROCEDURE — 93571 IV DOP VEL&/PRESS C FLO 1ST: CPT

## 2023-05-16 PROCEDURE — 93454 CORONARY ARTERY ANGIO S&I: CPT

## 2023-05-16 PROCEDURE — 99153 MOD SED SAME PHYS/QHP EA: CPT

## 2023-05-16 PROCEDURE — 99152 MOD SED SAME PHYS/QHP 5/>YRS: CPT

## 2023-05-16 RX ADMIN — CEFAZOLIN SCH: 330 INJECTION, POWDER, FOR SOLUTION INTRAMUSCULAR; INTRAVENOUS at 22:39

## 2023-05-16 RX ADMIN — CEFAZOLIN SCH: 330 INJECTION, POWDER, FOR SOLUTION INTRAMUSCULAR; INTRAVENOUS at 22:44

## 2023-05-16 RX ADMIN — CEFAZOLIN SCH: 330 INJECTION, POWDER, FOR SOLUTION INTRAMUSCULAR; INTRAVENOUS at 22:38

## 2023-05-16 RX ADMIN — CEFAZOLIN SCH MLS: 330 INJECTION, POWDER, FOR SOLUTION INTRAMUSCULAR; INTRAVENOUS at 10:18

## 2023-05-16 NOTE — P.PCN
Date of Procedure: 05/16/23


Operative Findings: 








CARDIAC CATHETERIZATION





PERFORMING PHYSICIAN: 


Chandrakant Mccarty MD, RPVI





PROCEDURE PERFORMED:


1.  Selective right and left coronary angiogram


2.  FFR of the first diagonal branch of the LAD


3.  Ultrasound-guided access of the right radial artery





INDICATION:


Chest discomfort and this 58-year-old gentleman who underwent a stress test 

showed lateral ischemia





COMPLICATION:


None





APPROACH:


Right radial artery





LEVEL OF SEDATION:


Moderate with a sedation length of 40 minutes





PROCEDURE DESCRIPTION:


After obtaining an informed consent, the patient was brought to cardiac cath 

lab.  Local anesthesia was performed using lidocaine subcutaneously.  The right 

radial artery was cannulated using Seldinger technique, the guidewire passed 

easily, following that we advanced a 5-Estonian sheath dilator assembly, the wire 

and dilator were removed and sheath was flushed.  





Following that, 2 mg of verapamil along with 5000 unit heparin were given.





Selective right and left coronary angiogram using a 6-Estonian JR4 and JL 3.5 

catheters.  





Following that I did an FFR of the diagonal.  





The procedure was completed there was no complication.





SELECTIVE CORONARY ANGIOGRAM:


The right coronary artery:


Large caliber vessel and a dominant vessel.  Its angiographically normal.





Left main:


It is angiographically normal.  Bifurcates into an LCx and LAD





The left circumflex:


Large-caliber vessel and codominant vessel.  The LCx is angiographically normal.

 Gives rise into a large OM branch which appears to be angiographically normal 

and distally bifurcates into PDA and PLV branches both appeared to be 

angiographically normal





The left anterior descending artery:


The LAD is angiographically normal and gives rise into a large diagonal branch 

which seems to be angiographically normal.  Approximately gives rise into the 

first diagonal branch which has an intermediate lesion appears to be in the 

range of 60%.





FFR OF THE DIAG:


Anticoagulation was achieved using heparin with continuous ACT monitoring.  

After an 0 point Doppler wire and equalizing between the Doppler wire and 

guiding catheter and after engaging the left main and wire into diagonal we did 

initially iFR and then an FFR.  Both came in to be nonischemic.  The procedure 

was completed was no complication





CONCLUSION:


1. Intermediate disease involving the first diagonal branch. FFR was nonischemic







POSTPROCEDURE MANAGEMENT:


 medical treatment and follow-up with the patient

## 2023-05-17 VITALS
SYSTOLIC BLOOD PRESSURE: 136 MMHG | DIASTOLIC BLOOD PRESSURE: 78 MMHG | RESPIRATION RATE: 20 BRPM | HEART RATE: 61 BPM | TEMPERATURE: 97.9 F

## 2023-05-18 NOTE — P.DS
Providers


Attending physician: 


Chandrakant Mccarty





Primary care physician: 


[facility] Course: 





The patient is a pleasant 58-year-old gentleman who underwent 2 days ago heart 

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 

diagonal branch.  He underwent an FFR and that came in to be borderline.





She was kept overnight because the procedure was performed later during the day.





He was seen yesterday when he was stable and asymptomatic.





The patient is going to be discharged home


Patient Condition at Discharge: Fair





Plan - Discharge Summary


Discharge Rx Participant: No


New Discharge Prescriptions: 


Continue


   Lansoprazole 30 mg PO DAILY


   Apixaban [Eliquis] 5 mg PO BID #60 tab


   Metoprolol Tartrate [Lopressor] 50 mg PO BID  tab


   Ibuprofen [Motrin] 400 - 600 mg PO Q6HR PRN


     PRN Reason: Pain


   Losartan Potassium [Cozaar] 50 mg PO DAILY


Discharge Medication List





Lansoprazole 30 mg PO DAILY 09/21/20 [History]


Apixaban [Eliquis] 5 mg PO BID #60 tab 09/23/20 [Rx]


Metoprolol Tartrate [Lopressor] 50 mg PO BID  tab 12/18/20 [Rx]


Ibuprofen [Motrin] 400 - 600 mg PO Q6HR PRN 05/11/23 [History]


Losartan Potassium [Cozaar] 50 mg PO DAILY 05/11/23 [History]








Follow up Appointment(s)/Referral(s): 


Chandrakant Mccarty MD [STAFF PHYSICIAN] - 1 Week (APPOINTMENT MADE ON WEDNESDAY, MAY 

24TH @ 5:00PM )


Patient Instructions/Handouts:  Moderate Sedation (ED), After Radial Heart 

Catheterization (GEN)


Activity/Diet/Wound Care/Special Instructions: 


*NO LIFTING, PUSHING, OR PULLING ANYTHING OVER 5 POUNDS FOR 5 DAYS


*NO DRIVING FOR 3 DAYS


*YOU CAN REMOVE YOUR DRESSING AND SHOWER TOMORROW BUT DO NOT SUBMERSE YOUR 

PUNCTURE SITE IN WATER FOR A FEW DAYS TO PREVENT INFECTION - SO NO TUB BATHS, 

POOLS, HOT TUBS, DISHES...ETC


*ANY SIGNS OF BLEEDING (HARDNESS, SWELLING, OR EXCESSIVE BRUISING) HOLD DIRECT 

PRESSURE ON YOUR PUNCTURE SITE AND COME TO THE NEAREST EMERGENCY ROOM TO GET 

YOUR PUNCTURE SITE LOOKED AT - DO NOT DRIVE YOURSELF! EITHER CALL EMS OR HAVE 

SOMEONE DRIVE YOU!


Discharge Disposition: HOME SELF-CARE